# Patient Record
Sex: MALE | Race: WHITE | NOT HISPANIC OR LATINO | URBAN - METROPOLITAN AREA
[De-identification: names, ages, dates, MRNs, and addresses within clinical notes are randomized per-mention and may not be internally consistent; named-entity substitution may affect disease eponyms.]

---

## 2020-02-21 ENCOUNTER — EMERGENCY (EMERGENCY)
Facility: HOSPITAL | Age: 60
LOS: 0 days | Discharge: HOME | End: 2020-02-21
Attending: STUDENT IN AN ORGANIZED HEALTH CARE EDUCATION/TRAINING PROGRAM
Payer: MEDICARE

## 2020-02-21 VITALS
RESPIRATION RATE: 17 BRPM | OXYGEN SATURATION: 94 % | TEMPERATURE: 98 F | DIASTOLIC BLOOD PRESSURE: 84 MMHG | HEART RATE: 61 BPM | SYSTOLIC BLOOD PRESSURE: 157 MMHG

## 2020-02-21 VITALS — WEIGHT: 210.1 LBS | HEIGHT: 69 IN

## 2020-02-21 DIAGNOSIS — M25.562 PAIN IN LEFT KNEE: ICD-10-CM

## 2020-02-21 DIAGNOSIS — M25.462 EFFUSION, LEFT KNEE: ICD-10-CM

## 2020-02-21 DIAGNOSIS — H91.90 UNSPECIFIED HEARING LOSS, UNSPECIFIED EAR: ICD-10-CM

## 2020-02-21 PROCEDURE — 73564 X-RAY EXAM KNEE 4 OR MORE: CPT | Mod: LT

## 2020-02-21 PROCEDURE — 99283 EMERGENCY DEPT VISIT LOW MDM: CPT

## 2020-02-21 PROCEDURE — 99283 EMERGENCY DEPT VISIT LOW MDM: CPT | Mod: 25

## 2020-02-21 PROCEDURE — 73564 X-RAY EXAM KNEE 4 OR MORE: CPT | Mod: 26,LT

## 2020-02-21 NOTE — ED STATDOCS - MUSCULOSKELETAL, MLM
Swelling left anterior knee. Unable to straighten LLE at baseline. Sensation intact. Distal pulses intact.

## 2020-02-21 NOTE — ED STATDOCS - OBJECTIVE STATEMENT
58 y/o male with a PMHx of presents to the ED c/o left knee pain x5 days. Pt reports 5 days ago his left leg felt tight and has pain since. Pt reports he can not bear weight secondary to pain. Does not have an orthopedist. No other complaints at this time.

## 2020-02-21 NOTE — ED STATDOCS - PROGRESS NOTE DETAILS
60 y/o M presents with L knee pain x 5 days. Pt wears a knee brace at baseline secondary to prior spinal cord injury. Pt states over the past few days he has had difficulty bearing weight on his L knee. Denies fever/chills, trauma, n/v, or other complaints at this time  Ext: moderate swelling to L anterior knee. No erythema. baseline ROM. 2+ distal pulses. Large joint effusion on xray, Ace bandage applied, Recommend RICE therapy, ortho FU. -Vinicius Horner PA-C

## 2020-02-21 NOTE — ED STATDOCS - PATIENT PORTAL LINK FT
You can access the FollowMyHealth Patient Portal offered by [facility] by registering at the following website: [email or web address]. By joining [facility]’s FollowMyHealth portal, you will also be able to view your health information using other applications (apps) compatible with our system.

## 2020-02-21 NOTE — ED STATDOCS - ATTENDING CONTRIBUTION TO CARE
I, Taya Jewell DO,  performed the initial face to face bedside interview with this patient regarding history of present illness, review of symptoms and relevant past medical, social and family history.  I completed an independent physical examination.  I was the initial provider who evaluated this patient. I have signed out the follow up of any pending tests (i.e. labs, radiological studies) to the ACP.  I have communicated the patient’s plan of care and disposition with the ACP.  The history, relevant review of systems, past medical and surgical history, medical decision making, and physical examination was documented by the scribe in my presence and I attest to the accuracy of the documentation.

## 2020-02-21 NOTE — ED ADULT TRIAGE NOTE - CHIEF COMPLAINT QUOTE
Patient presents complaining of left knee pain. patient has brace on leg from chronic muscle weakness due to hx of skull fracture.

## 2020-02-21 NOTE — ED STATDOCS - CARE PROVIDER_API CALL
Billy Silva)  Tonny Reynaga  Physicians  [address] [place] [address]  Phone: [phone]  Fax: [phone]  Follow Up Time: Urgent

## 2023-09-11 ENCOUNTER — OFFICE (OUTPATIENT)
Dept: URBAN - METROPOLITAN AREA CLINIC 109 | Facility: CLINIC | Age: 63
Setting detail: OPHTHALMOLOGY
End: 2023-09-11
Payer: MEDICARE

## 2023-09-11 DIAGNOSIS — H25.11: ICD-10-CM

## 2023-09-11 PROCEDURE — 99204 OFFICE O/P NEW MOD 45 MIN: CPT | Performed by: OPHTHALMOLOGY

## 2023-09-11 ASSESSMENT — KERATOMETRY
OD_K1POWER_DIOPTERS: 44.75
OD_K2POWER_DIOPTERS: 45.50
OS_AXISANGLE_DEGREES: 121
OD_AXISANGLE_DEGREES: 055
OS_K2POWER_DIOPTERS: 45.25
OS_K1POWER_DIOPTERS: 45.00

## 2023-09-11 ASSESSMENT — REFRACTION_AUTOREFRACTION
OS_AXIS: 104
OD_CYLINDER: -1.25
OD_AXIS: 092
OS_SPHERE: +1.50
OS_CYLINDER: -1.00
OD_SPHERE: -0.50

## 2023-09-11 ASSESSMENT — VISUAL ACUITY
OS_BCVA: 20/60-2
OD_BCVA: 20/20

## 2023-09-11 ASSESSMENT — SPHEQUIV_DERIVED
OD_SPHEQUIV: -1.125
OS_SPHEQUIV: 1

## 2023-09-11 ASSESSMENT — CONFRONTATIONAL VISUAL FIELD TEST (CVF)
OS_FINDINGS: FULL
OD_FINDINGS: FULL

## 2023-09-11 ASSESSMENT — TONOMETRY
OS_IOP_MMHG: 12
OD_IOP_MMHG: 11

## 2023-09-11 ASSESSMENT — AXIALLENGTH_DERIVED
OS_AL: 22.645
OD_AL: 23.4344

## 2023-10-04 ENCOUNTER — OFFICE (OUTPATIENT)
Dept: URBAN - METROPOLITAN AREA CLINIC 109 | Facility: CLINIC | Age: 63
Setting detail: OPHTHALMOLOGY
End: 2023-10-04
Payer: MEDICARE

## 2023-10-04 DIAGNOSIS — H25.11: ICD-10-CM

## 2023-10-04 PROCEDURE — 92136 OPHTHALMIC BIOMETRY: CPT | Mod: RT | Performed by: OPHTHALMOLOGY

## 2023-10-04 PROCEDURE — 99213 OFFICE O/P EST LOW 20 MIN: CPT | Performed by: OPHTHALMOLOGY

## 2023-10-04 ASSESSMENT — KERATOMETRY
OS_AXISANGLE_DEGREES: 31
OS_K2POWER_DIOPTERS: 45.25
OD_K2POWER_DIOPTERS: 45.50
OD_K2POWER_DIOPTERS: 45.50
OS_AXISANGLE_DEGREES: 121
OS_K1POWER_DIOPTERS: 45.00
OD_CYLPOWER_DEGREES: 0.75
OD_AXISANGLE_DEGREES: 145
OD_CYLAXISANGLE_DEGREES: 055
OD_AXISANGLE_DEGREES: 055
OS_CYLPOWER_DEGREES: 0.25
OS_K1K2_AVERAGE: 45.125
OD_AXISANGLE2_DEGREES: 055
OD_K1POWER_DIOPTERS: 44.75
OS_AXISANGLE2_DEGREES: 121
OD_K1K2_AVERAGE: 45.125
OS_CYLAXISANGLE_DEGREES: 121
OS_K1POWER_DIOPTERS: 45.00
OS_K2POWER_DIOPTERS: 45.25
OD_K1POWER_DIOPTERS: 44.75

## 2023-10-04 ASSESSMENT — VISUAL ACUITY
OS_BCVA: 20/60-2
OD_BCVA: 20/20

## 2023-10-04 ASSESSMENT — CONFRONTATIONAL VISUAL FIELD TEST (CVF)
OD_FINDINGS: FULL
OS_FINDINGS: FULL

## 2023-10-04 ASSESSMENT — REFRACTION_AUTOREFRACTION
OD_SPHERE: -0.50
OD_CYLINDER: -1.25
OD_AXIS: 092
OS_CYLINDER: -1.00
OS_AXIS: 104
OS_SPHERE: +1.50

## 2023-10-04 ASSESSMENT — AXIALLENGTH_DERIVED
OS_AL: 22.645
OD_AL: 23.4344

## 2023-10-04 ASSESSMENT — SPHEQUIV_DERIVED
OD_SPHEQUIV: -1.125
OS_SPHEQUIV: 1

## 2023-10-10 ENCOUNTER — ASC (OUTPATIENT)
Dept: URBAN - METROPOLITAN AREA SURGERY 8 | Facility: SURGERY | Age: 63
Setting detail: OPHTHALMOLOGY
End: 2023-10-10
Payer: MEDICARE

## 2023-10-10 DIAGNOSIS — H25.11: ICD-10-CM

## 2023-10-10 PROCEDURE — 66984 XCAPSL CTRC RMVL W/O ECP: CPT | Mod: RT | Performed by: OPHTHALMOLOGY

## 2023-10-11 ENCOUNTER — OFFICE (OUTPATIENT)
Dept: URBAN - METROPOLITAN AREA CLINIC 109 | Facility: CLINIC | Age: 63
Setting detail: OPHTHALMOLOGY
End: 2023-10-11
Payer: MEDICARE

## 2023-10-11 ENCOUNTER — RX ONLY (RX ONLY)
Age: 63
End: 2023-10-11

## 2023-10-11 DIAGNOSIS — Z96.1: ICD-10-CM

## 2023-10-11 PROCEDURE — 99024 POSTOP FOLLOW-UP VISIT: CPT | Performed by: OPHTHALMOLOGY

## 2023-10-11 ASSESSMENT — KERATOMETRY
OS_K2POWER_DIOPTERS: 45.25
OD_K1POWER_DIOPTERS: 44.75
OD_AXISANGLE_DEGREES: 055
OD_K2POWER_DIOPTERS: 45.50
OS_AXISANGLE_DEGREES: 121
OS_K1POWER_DIOPTERS: 45.00

## 2023-10-11 ASSESSMENT — CONFRONTATIONAL VISUAL FIELD TEST (CVF)
OS_FINDINGS: FULL
OD_FINDINGS: FULL

## 2023-10-11 ASSESSMENT — REFRACTION_AUTOREFRACTION
OS_SPHERE: +1.50
OS_CYLINDER: -1.00
OD_AXIS: 092
OS_AXIS: 104
OD_SPHERE: -0.50
OD_CYLINDER: -1.25

## 2023-10-11 ASSESSMENT — VISUAL ACUITY
OS_BCVA: 20/25+1
OD_BCVA: 20/20

## 2023-10-11 ASSESSMENT — SPHEQUIV_DERIVED
OD_SPHEQUIV: -1.125
OS_SPHEQUIV: 1

## 2023-10-11 ASSESSMENT — AXIALLENGTH_DERIVED
OD_AL: 23.4344
OS_AL: 22.645

## 2023-10-11 ASSESSMENT — TONOMETRY: OD_IOP_MMHG: 18

## 2023-11-01 ENCOUNTER — OFFICE (OUTPATIENT)
Dept: URBAN - METROPOLITAN AREA CLINIC 109 | Facility: CLINIC | Age: 63
Setting detail: OPHTHALMOLOGY
End: 2023-11-01
Payer: MEDICARE

## 2023-11-01 DIAGNOSIS — Z96.1: ICD-10-CM

## 2023-11-01 PROCEDURE — 99024 POSTOP FOLLOW-UP VISIT: CPT | Performed by: OPHTHALMOLOGY

## 2023-11-01 ASSESSMENT — SPHEQUIV_DERIVED
OD_SPHEQUIV: 0.125
OS_SPHEQUIV: 0.625

## 2023-11-01 ASSESSMENT — REFRACTION_AUTOREFRACTION
OD_SPHERE: +0.50
OS_CYLINDER: -0.75
OS_SPHERE: +1.00
OD_CYLINDER: -0.75
OS_AXIS: 85
OD_AXIS: 121

## 2023-11-01 ASSESSMENT — CONFRONTATIONAL VISUAL FIELD TEST (CVF)
OD_FINDINGS: FULL
OS_FINDINGS: FULL

## 2024-09-11 ENCOUNTER — OFFICE (OUTPATIENT)
Dept: URBAN - METROPOLITAN AREA CLINIC 109 | Facility: CLINIC | Age: 64
Setting detail: OPHTHALMOLOGY
End: 2024-09-11
Payer: MEDICARE

## 2024-09-11 DIAGNOSIS — H01.005: ICD-10-CM

## 2024-09-11 DIAGNOSIS — H01.002: ICD-10-CM

## 2024-09-11 DIAGNOSIS — H01.001: ICD-10-CM

## 2024-09-11 DIAGNOSIS — H01.004: ICD-10-CM

## 2024-09-11 PROCEDURE — 99213 OFFICE O/P EST LOW 20 MIN: CPT | Performed by: OPHTHALMOLOGY

## 2024-09-11 ASSESSMENT — LID EXAM ASSESSMENTS
OS_BLEPHARITIS: LLL LUL T
OD_BLEPHARITIS: RLL RUL T

## 2024-09-11 ASSESSMENT — CONFRONTATIONAL VISUAL FIELD TEST (CVF)
OS_FINDINGS: FULL
OD_FINDINGS: FULL

## 2024-11-05 PROBLEM — H91.90 UNSPECIFIED HEARING LOSS, UNSPECIFIED EAR: Chronic | Status: ACTIVE | Noted: 2020-02-21

## 2024-11-07 PROBLEM — Z00.00 ENCOUNTER FOR PREVENTIVE HEALTH EXAMINATION: Status: ACTIVE | Noted: 2024-11-07

## 2024-11-11 ENCOUNTER — APPOINTMENT (OUTPATIENT)
Dept: OTOLARYNGOLOGY | Facility: CLINIC | Age: 64
End: 2024-11-11
Payer: MEDICARE

## 2024-11-11 VITALS
HEART RATE: 50 BPM | HEIGHT: 69 IN | WEIGHT: 205 LBS | BODY MASS INDEX: 30.36 KG/M2 | SYSTOLIC BLOOD PRESSURE: 153 MMHG | DIASTOLIC BLOOD PRESSURE: 84 MMHG

## 2024-11-11 DIAGNOSIS — H90.3 SENSORINEURAL HEARING LOSS, BILATERAL: ICD-10-CM

## 2024-11-11 PROCEDURE — 99204 OFFICE O/P NEW MOD 45 MIN: CPT

## 2024-11-23 ENCOUNTER — EMERGENCY (EMERGENCY)
Facility: HOSPITAL | Age: 64
LOS: 0 days | Discharge: HOME | End: 2024-11-23
Attending: EMERGENCY MEDICINE
Payer: MEDICARE

## 2024-11-23 ENCOUNTER — INPATIENT (INPATIENT)
Facility: HOSPITAL | Age: 64
LOS: 8 days | Discharge: NURSING, REHAB OR LONG-TERM CARE | DRG: 563 | End: 2024-12-02
Attending: INTERNAL MEDICINE | Admitting: INTERNAL MEDICINE
Payer: MEDICARE

## 2024-11-23 VITALS
HEART RATE: 54 BPM | HEIGHT: 70 IN | TEMPERATURE: 98 F | RESPIRATION RATE: 16 BRPM | DIASTOLIC BLOOD PRESSURE: 90 MMHG | WEIGHT: 205.03 LBS | SYSTOLIC BLOOD PRESSURE: 149 MMHG | OXYGEN SATURATION: 99 %

## 2024-11-23 VITALS
TEMPERATURE: 98 F | RESPIRATION RATE: 16 BRPM | HEIGHT: 70 IN | OXYGEN SATURATION: 98 % | SYSTOLIC BLOOD PRESSURE: 182 MMHG | WEIGHT: 205.03 LBS | DIASTOLIC BLOOD PRESSURE: 79 MMHG | HEART RATE: 53 BPM

## 2024-11-23 VITALS
DIASTOLIC BLOOD PRESSURE: 79 MMHG | TEMPERATURE: 98 F | HEART RATE: 62 BPM | OXYGEN SATURATION: 96 % | RESPIRATION RATE: 16 BRPM | SYSTOLIC BLOOD PRESSURE: 139 MMHG

## 2024-11-23 DIAGNOSIS — S43.005A UNSPECIFIED DISLOCATION OF LEFT SHOULDER JOINT, INITIAL ENCOUNTER: ICD-10-CM

## 2024-11-23 DIAGNOSIS — S43.015A ANTERIOR DISLOCATION OF LEFT HUMERUS, INITIAL ENCOUNTER: ICD-10-CM
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
## 2024-11-23 LAB
ALBUMIN SERPL ELPH-MCNC: 3.9 G/DL — SIGNIFICANT CHANGE UP (ref 3.3–5)
ALP SERPL-CCNC: 65 U/L — SIGNIFICANT CHANGE UP (ref 40–120)
ALT FLD-CCNC: 36 U/L — SIGNIFICANT CHANGE UP (ref 12–78)
ANION GAP SERPL CALC-SCNC: 3 MMOL/L — LOW (ref 5–17)
APPEARANCE UR: CLEAR — SIGNIFICANT CHANGE UP
AST SERPL-CCNC: 29 U/L — SIGNIFICANT CHANGE UP (ref 15–37)
BACTERIA # UR AUTO: ABNORMAL /HPF
BASOPHILS # BLD AUTO: 0.04 K/UL — SIGNIFICANT CHANGE UP (ref 0–0.2)
BASOPHILS # BLD AUTO: 0.04 K/UL — SIGNIFICANT CHANGE UP (ref 0–0.2)
BASOPHILS NFR BLD AUTO: 0.3 % — SIGNIFICANT CHANGE UP (ref 0–2)
BASOPHILS NFR BLD AUTO: 0.3 % — SIGNIFICANT CHANGE UP (ref 0–2)
BILIRUB SERPL-MCNC: 0.7 MG/DL — SIGNIFICANT CHANGE UP (ref 0.2–1.2)
BILIRUB UR-MCNC: NEGATIVE — SIGNIFICANT CHANGE UP
BUN SERPL-MCNC: 23 MG/DL — SIGNIFICANT CHANGE UP (ref 7–23)
CALCIUM SERPL-MCNC: 9.4 MG/DL — SIGNIFICANT CHANGE UP (ref 8.5–10.1)
CAST: 0 /LPF — SIGNIFICANT CHANGE UP (ref 0–4)
CHLORIDE SERPL-SCNC: 106 MMOL/L — SIGNIFICANT CHANGE UP (ref 96–108)
CO2 SERPL-SCNC: 30 MMOL/L — SIGNIFICANT CHANGE UP (ref 22–31)
COLOR SPEC: YELLOW — SIGNIFICANT CHANGE UP
CREAT SERPL-MCNC: 0.84 MG/DL — SIGNIFICANT CHANGE UP (ref 0.5–1.3)
DIFF PNL FLD: ABNORMAL
EGFR: 97 ML/MIN/1.73M2 — SIGNIFICANT CHANGE UP
EOSINOPHIL # BLD AUTO: 0.03 K/UL — SIGNIFICANT CHANGE UP (ref 0–0.5)
EOSINOPHIL # BLD AUTO: 0.1 K/UL — SIGNIFICANT CHANGE UP (ref 0–0.5)
EOSINOPHIL NFR BLD AUTO: 0.2 % — SIGNIFICANT CHANGE UP (ref 0–6)
EOSINOPHIL NFR BLD AUTO: 0.7 % — SIGNIFICANT CHANGE UP (ref 0–6)
GLUCOSE SERPL-MCNC: 122 MG/DL — HIGH (ref 70–99)
GLUCOSE UR QL: NEGATIVE MG/DL — SIGNIFICANT CHANGE UP
HCT VFR BLD CALC: 46.2 % — SIGNIFICANT CHANGE UP (ref 39–50)
HCT VFR BLD CALC: 49.1 % — SIGNIFICANT CHANGE UP (ref 39–50)
HGB BLD-MCNC: 15.7 G/DL — SIGNIFICANT CHANGE UP (ref 13–17)
HGB BLD-MCNC: 16.6 G/DL — SIGNIFICANT CHANGE UP (ref 13–17)
IMM GRANULOCYTES NFR BLD AUTO: 0.4 % — SIGNIFICANT CHANGE UP (ref 0–0.9)
IMM GRANULOCYTES NFR BLD AUTO: 0.5 % — SIGNIFICANT CHANGE UP (ref 0–0.9)
KETONES UR-MCNC: 15 MG/DL
LACTATE SERPL-SCNC: 1.2 MMOL/L — SIGNIFICANT CHANGE UP (ref 0.7–2)
LEUKOCYTE ESTERASE UR-ACNC: ABNORMAL
LYMPHOCYTES # BLD AUTO: 1.39 K/UL — SIGNIFICANT CHANGE UP (ref 1–3.3)
LYMPHOCYTES # BLD AUTO: 1.4 K/UL — SIGNIFICANT CHANGE UP (ref 1–3.3)
LYMPHOCYTES # BLD AUTO: 10 % — LOW (ref 13–44)
LYMPHOCYTES # BLD AUTO: 10.3 % — LOW (ref 13–44)
MCHC RBC-ENTMCNC: 33.7 PG — SIGNIFICANT CHANGE UP (ref 27–34)
MCHC RBC-ENTMCNC: 33.8 G/DL — SIGNIFICANT CHANGE UP (ref 32–36)
MCHC RBC-ENTMCNC: 34 G/DL — SIGNIFICANT CHANGE UP (ref 32–36)
MCHC RBC-ENTMCNC: 34.1 PG — HIGH (ref 27–34)
MCV RBC AUTO: 100.2 FL — HIGH (ref 80–100)
MCV RBC AUTO: 99.6 FL — SIGNIFICANT CHANGE UP (ref 80–100)
MONOCYTES # BLD AUTO: 1.1 K/UL — HIGH (ref 0–0.9)
MONOCYTES # BLD AUTO: 1.19 K/UL — HIGH (ref 0–0.9)
MONOCYTES NFR BLD AUTO: 7.9 % — SIGNIFICANT CHANGE UP (ref 2–14)
MONOCYTES NFR BLD AUTO: 8.8 % — SIGNIFICANT CHANGE UP (ref 2–14)
NEUTROPHILS # BLD AUTO: 10.83 K/UL — HIGH (ref 1.8–7.4)
NEUTROPHILS # BLD AUTO: 11.27 K/UL — HIGH (ref 1.8–7.4)
NEUTROPHILS NFR BLD AUTO: 79.9 % — HIGH (ref 43–77)
NEUTROPHILS NFR BLD AUTO: 80.7 % — HIGH (ref 43–77)
NITRITE UR-MCNC: POSITIVE
PH UR: 6.5 — SIGNIFICANT CHANGE UP (ref 5–8)
PLATELET # BLD AUTO: 235 K/UL — SIGNIFICANT CHANGE UP (ref 150–400)
PLATELET # BLD AUTO: 249 K/UL — SIGNIFICANT CHANGE UP (ref 150–400)
POTASSIUM SERPL-MCNC: 4.1 MMOL/L — SIGNIFICANT CHANGE UP (ref 3.5–5.3)
POTASSIUM SERPL-SCNC: 4.1 MMOL/L — SIGNIFICANT CHANGE UP (ref 3.5–5.3)
PROT SERPL-MCNC: 8 GM/DL — SIGNIFICANT CHANGE UP (ref 6–8.3)
PROT UR-MCNC: SIGNIFICANT CHANGE UP MG/DL
RBC # BLD: 4.61 M/UL — SIGNIFICANT CHANGE UP (ref 4.2–5.8)
RBC # BLD: 4.93 M/UL — SIGNIFICANT CHANGE UP (ref 4.2–5.8)
RBC # FLD: 11.9 % — SIGNIFICANT CHANGE UP (ref 10.3–14.5)
RBC # FLD: 12 % — SIGNIFICANT CHANGE UP (ref 10.3–14.5)
RBC CASTS # UR COMP ASSIST: 1 /HPF — SIGNIFICANT CHANGE UP (ref 0–4)
SODIUM SERPL-SCNC: 139 MMOL/L — SIGNIFICANT CHANGE UP (ref 135–145)
SP GR SPEC: 1.02 — SIGNIFICANT CHANGE UP (ref 1–1.03)
SQUAMOUS # UR AUTO: 0 /HPF — SIGNIFICANT CHANGE UP (ref 0–5)
UROBILINOGEN FLD QL: 0.2 MG/DL — SIGNIFICANT CHANGE UP (ref 0.2–1)
WBC # BLD: 13.56 K/UL — HIGH (ref 3.8–10.5)
WBC # BLD: 13.96 K/UL — HIGH (ref 3.8–10.5)
WBC # FLD AUTO: 13.56 K/UL — HIGH (ref 3.8–10.5)
WBC # FLD AUTO: 13.96 K/UL — HIGH (ref 3.8–10.5)
WBC UR QL: 152 /HPF — HIGH (ref 0–5)

## 2024-11-23 PROCEDURE — 23650 CLTX SHO DSLC W/MNPJ WO ANES: CPT | Mod: 54,LT,77

## 2024-11-23 PROCEDURE — 97162 PT EVAL MOD COMPLEX 30 MIN: CPT | Mod: GP

## 2024-11-23 PROCEDURE — 73020 X-RAY EXAM OF SHOULDER: CPT | Mod: LT

## 2024-11-23 PROCEDURE — 73080 X-RAY EXAM OF ELBOW: CPT | Mod: RT

## 2024-11-23 PROCEDURE — 85025 COMPLETE CBC W/AUTO DIFF WBC: CPT

## 2024-11-23 PROCEDURE — 70551 MRI BRAIN STEM W/O DYE: CPT | Mod: MC

## 2024-11-23 PROCEDURE — 73030 X-RAY EXAM OF SHOULDER: CPT | Mod: LT

## 2024-11-23 PROCEDURE — 93010 ELECTROCARDIOGRAM REPORT: CPT

## 2024-11-23 PROCEDURE — 80048 BASIC METABOLIC PNL TOTAL CA: CPT

## 2024-11-23 PROCEDURE — 71045 X-RAY EXAM CHEST 1 VIEW: CPT | Mod: 26

## 2024-11-23 PROCEDURE — 72040 X-RAY EXAM NECK SPINE 2-3 VW: CPT

## 2024-11-23 PROCEDURE — 73020 X-RAY EXAM OF SHOULDER: CPT | Mod: 26,59,76,LT

## 2024-11-23 PROCEDURE — 90662 IIV NO PRSV INCREASED AG IM: CPT

## 2024-11-23 PROCEDURE — 80053 COMPREHEN METABOLIC PANEL: CPT

## 2024-11-23 PROCEDURE — 85610 PROTHROMBIN TIME: CPT

## 2024-11-23 PROCEDURE — 72128 CT CHEST SPINE W/O DYE: CPT | Mod: MC

## 2024-11-23 PROCEDURE — 36415 COLL VENOUS BLD VENIPUNCTURE: CPT

## 2024-11-23 PROCEDURE — 72131 CT LUMBAR SPINE W/O DYE: CPT | Mod: MC

## 2024-11-23 PROCEDURE — 99285 EMERGENCY DEPT VISIT HI MDM: CPT | Mod: 57

## 2024-11-23 PROCEDURE — 96374 THER/PROPH/DIAG INJ IV PUSH: CPT | Mod: XU

## 2024-11-23 PROCEDURE — 99223 1ST HOSP IP/OBS HIGH 75: CPT

## 2024-11-23 PROCEDURE — 73020 X-RAY EXAM OF SHOULDER: CPT | Mod: 26,59,LT

## 2024-11-23 PROCEDURE — 97166 OT EVAL MOD COMPLEX 45 MIN: CPT | Mod: GO

## 2024-11-23 PROCEDURE — 72170 X-RAY EXAM OF PELVIS: CPT

## 2024-11-23 PROCEDURE — 99285 EMERGENCY DEPT VISIT HI MDM: CPT | Mod: 25

## 2024-11-23 PROCEDURE — 97116 GAIT TRAINING THERAPY: CPT | Mod: GP

## 2024-11-23 PROCEDURE — 85027 COMPLETE CBC AUTOMATED: CPT

## 2024-11-23 PROCEDURE — 73060 X-RAY EXAM OF HUMERUS: CPT | Mod: 26,LT

## 2024-11-23 PROCEDURE — 72170 X-RAY EXAM OF PELVIS: CPT | Mod: 26

## 2024-11-23 PROCEDURE — 23545 CLTX ACROMCLAV DISLC W/MNPJ: CPT | Mod: LT

## 2024-11-23 PROCEDURE — 93306 TTE W/DOPPLER COMPLETE: CPT

## 2024-11-23 PROCEDURE — 97530 THERAPEUTIC ACTIVITIES: CPT | Mod: GO

## 2024-11-23 PROCEDURE — 97535 SELF CARE MNGMENT TRAINING: CPT | Mod: GO

## 2024-11-23 PROCEDURE — 90471 IMMUNIZATION ADMIN: CPT

## 2024-11-23 PROCEDURE — 72125 CT NECK SPINE W/O DYE: CPT | Mod: MC

## 2024-11-23 PROCEDURE — 84443 ASSAY THYROID STIM HORMONE: CPT

## 2024-11-23 PROCEDURE — 83036 HEMOGLOBIN GLYCOSYLATED A1C: CPT

## 2024-11-23 PROCEDURE — 73030 X-RAY EXAM OF SHOULDER: CPT | Mod: 26,LT,76

## 2024-11-23 PROCEDURE — 96375 TX/PRO/DX INJ NEW DRUG ADDON: CPT | Mod: XU

## 2024-11-23 PROCEDURE — 73060 X-RAY EXAM OF HUMERUS: CPT | Mod: LT

## 2024-11-23 PROCEDURE — 85730 THROMBOPLASTIN TIME PARTIAL: CPT

## 2024-11-23 PROCEDURE — 70450 CT HEAD/BRAIN W/O DYE: CPT | Mod: MC

## 2024-11-23 PROCEDURE — 73030 X-RAY EXAM OF SHOULDER: CPT | Mod: 26,LT

## 2024-11-23 PROCEDURE — 80061 LIPID PANEL: CPT

## 2024-11-23 PROCEDURE — 93005 ELECTROCARDIOGRAM TRACING: CPT

## 2024-11-23 RX ORDER — CEFTRIAXONE SODIUM 1 G
1000 VIAL (EA) INJECTION ONCE
Refills: 0 | Status: COMPLETED | OUTPATIENT
Start: 2024-11-23 | End: 2024-11-23

## 2024-11-23 RX ORDER — ACETAMINOPHEN 500MG 500 MG/1
1000 TABLET, COATED ORAL ONCE
Refills: 0 | Status: COMPLETED | OUTPATIENT
Start: 2024-11-23 | End: 2024-11-23

## 2024-11-23 RX ORDER — SODIUM CHLORIDE 9 MG/ML
2800 INJECTION, SOLUTION INTRAMUSCULAR; INTRAVENOUS; SUBCUTANEOUS ONCE
Refills: 0 | Status: COMPLETED | OUTPATIENT
Start: 2024-11-23 | End: 2024-11-23

## 2024-11-23 RX ORDER — INFLUENZA VIRUS VACCINE 15; 15; 15; 15 UG/.5ML; UG/.5ML; UG/.5ML; UG/.5ML
0.5 SUSPENSION INTRAMUSCULAR ONCE
Refills: 0 | Status: COMPLETED | OUTPATIENT
Start: 2024-11-23 | End: 2024-12-02

## 2024-11-23 RX ORDER — OXYCODONE HYDROCHLORIDE AND ACETAMINOPHEN 10; 325 MG/1; MG/1
1 TABLET ORAL
Qty: 10 | Refills: 0
Start: 2024-11-23 | End: 2024-11-25

## 2024-11-23 RX ORDER — ONDANSETRON HCL/PF 4 MG/2 ML
4 VIAL (ML) INJECTION ONCE
Refills: 0 | Status: COMPLETED | OUTPATIENT
Start: 2024-11-23 | End: 2024-11-23

## 2024-11-23 RX ORDER — ACETAMINOPHEN 500MG 500 MG/1
650 TABLET, COATED ORAL EVERY 6 HOURS
Refills: 0 | Status: DISCONTINUED | OUTPATIENT
Start: 2024-11-23 | End: 2024-12-02

## 2024-11-23 RX ADMIN — ACETAMINOPHEN 500MG 400 MILLIGRAM(S): 500 TABLET, COATED ORAL at 19:05

## 2024-11-23 RX ADMIN — Medication 3 MILLILITER(S): at 15:06

## 2024-11-23 RX ADMIN — ACETAMINOPHEN 500MG 1000 MILLIGRAM(S): 500 TABLET, COATED ORAL at 21:51

## 2024-11-23 RX ADMIN — Medication 4 MILLIGRAM(S): at 15:04

## 2024-11-23 RX ADMIN — SODIUM CHLORIDE 2800 MILLILITER(S): 9 INJECTION, SOLUTION INTRAMUSCULAR; INTRAVENOUS; SUBCUTANEOUS at 20:01

## 2024-11-23 RX ADMIN — Medication 1000 MILLIGRAM(S): at 19:55

## 2024-11-23 RX ADMIN — Medication 2 MILLIGRAM(S): at 15:04

## 2024-11-23 NOTE — PATIENT PROFILE ADULT - FALL HARM RISK - HARM RISK INTERVENTIONS

## 2024-11-23 NOTE — ED PROVIDER NOTE - OBJECTIVE STATEMENT
64-year-old male past medical history of left lower extremity weakness/paralysis due to a spinal cord injury 1987, hard of hearing, who presents with chief complaint of fall.  Patient was evaluated earlier in the ED, patient had tripped and fallen while trying to walk into the garage.  Patient complained of shoulder pain.  Patient had a positive anterior shoulder dislocation that was reduced by previous ED physician.  Patient was discharged home with sling.  The patient states that they went to go  prescription, and he fell again.  Complaining of some left shoulder pain and left arm numbness.  He denies head injury.  He denies any other injuries.  No other concerns.

## 2024-11-23 NOTE — H&P ADULT - HISTORY OF PRESENT ILLNESS
65 y/o M w/ PMH of LLE paralysis 2/2 spinal cord injury in 1987 w/ LLE brace, p/w mechanical fall. Patient had a trip and fall earlier and came to ED, found to have an anterior shoulder dislocation. Patient was discharged home w/ sling. Patient then had another mechanical fall where he tripped when he went to get prescription and came to ED again. Patient has a LLE brace since 1987 since his previous spinal cord injury, and now with a sling. Patient is having difficutly with ambulation. He lives alone at home       PSH: Spinal surgery     Social Hx: Denies tobacco / drugs, etoh - 2 drinks 2 times per week     Family Hx: Denies pertinent hx

## 2024-11-23 NOTE — ED ADULT NURSE NOTE - NSFALLRISKINTERV_ED_ALL_ED

## 2024-11-23 NOTE — H&P ADULT - ASSESSMENT
63 y/o M w/ PMH of LLE paralysis 2/2 spinal cord injury in  w/ LLE brace, p/w mechanical fall    *Anterior shoulder dislocation s/p mechanical fall  -Fall precautions  -PT consult  -Pain control  -F/u ortho     *Bradycardia  -Remote tele  -Cardio consult   -TSH   -EK bpm, incomplete RBBB   -Echo     *UTI  -Ceftriaxone  -F/u urine cx     *Advance Directives  -Denies having advance care directives in place     *DVT ppx  -Heparin SubQ     >75 mins required for admission    65 y/o M w/ PMH of LLE paralysis 2/2 spinal cord injury in  w/ LLE brace, p/w mechanical fall    *Anterior shoulder dislocation s/p mechanical fall  -Fall precautions  -PT consult  -Pain control  -F/u ortho   -Social Work consult     *Bradycardia  -Remote tele  -Cardio consult   -TSH   -EK bpm, incomplete RBBB   -Echo     *UTI  -Ceftriaxone  -F/u urine cx     *Advance Directives  -Denies having advance care directives in place     *DVT ppx  -Heparin SubQ     >75 mins required for admission

## 2024-11-23 NOTE — ED ADULT NURSE REASSESSMENT NOTE - NS ED NURSE REASSESS COMMENT FT1
Received patient AxOX4, resting comfortably in bed. LUE in sling, able to move fingers, positive pulse, denies numbness or tingling. Positive cap refill. Denies pain at present. LLE brace in place, per patient, he has been wearing this since 1987 after sustaining a traumatic brain and spinal injury. VSS. Updated on plan of care, all questions and concerns addressed. [place] condom catheter in place, draining yellow urine. Stretcher locked and in lowest position, call bell within reach, will continue to monitor.

## 2024-11-23 NOTE — ED PROVIDER NOTE - PHYSICAL EXAMINATION
Patient is awake, alert, orient x 3  Head is normocephalic, atraumatic  Heart sounds within normal limits  Breath sounds within normal limits  Abdomen soft, nontender  Left upper extremity in a sling, mild tenderness palpation of the left shoulder, neurovascularly intact

## 2024-11-23 NOTE — ED ADULT TRIAGE NOTE - CHIEF COMPLAINT QUOTE
patient brought in by EMS c/o fall.  was dc from [facility] recently after having a fall this morning.  was at [facility] picking up prescription and had to  urinate,  attempted to get out of car and fell.  family unable to get him up so called 911.  denies pain from fall.

## 2024-11-23 NOTE — PHARMACOTHERAPY INTERVENTION NOTE - COMMENTS
Medication history complete. Medications and allergies reviewed with patient and confirmed with . Patient states he is not currently taking prescription medication.

## 2024-11-23 NOTE — ED ADULT NURSE NOTE - OBJECTIVE STATEMENT
64y male presents to the A/OX4, ED s/p fall. Pt was recently seen at  this afternoon for  fall and left shoulder dislocation, pt was d/c home but while at [facility] pt fell and was unable to get up. Pt has hx left sided paralysis due to spinal cord injury. Pt denies headstrike or injury, endorses left arm pain, numbness and tingling. MD Moya made aware.

## 2024-11-23 NOTE — ED ADULT TRIAGE NOTE - INTERNATIONAL TRAVEL
Pt c/o non-radiating midsternal chest pain described as "burning" x2 hours. States improvement in condition since arrival to ED. Hx hypothyroidism. Pt denies sob, n/v, dizziness, lightheadedness, fevers/chills. Pt well appearing. No

## 2024-11-23 NOTE — ED PROVIDER NOTE - CLINICAL SUMMARY MEDICAL DECISION MAKING FREE TEXT BOX
Patient with a shoulder dislocation again, this was reduced in the department.  Patient's paresthesias improved with reduction of the shoulder. Patient did not have a head injury or LOC, and neurologic exam is baseline.  Does not require head imaging. Patient also with a UTI, lactate was normal, mild elevation white blood cell count, patient was given IV fluids, Rocephin IV, as well as Tylenol for pain.  Patient mated to medicine service for further evaluation management given multiple falls, possible need for short-term rehab.

## 2024-11-23 NOTE — ED ADULT NURSE REASSESSMENT NOTE - NS ED NURSE REASSESS COMMENT FT1
pt assisted to wheelchair for d/c with great difficulty. pt unsteady and needs use of left arm to assist with movement. pt made aware that d/c home is unsafe and he is at increased risk for fall. pt refusing to stay and wishes to go home. pt states that he has multiple assistive devises at home to help him get around. Dr. Hill aware and agreeable that pt is fall risk at baseline and okay to d/c home with friend at bedside.

## 2024-11-23 NOTE — ED ADULT NURSE NOTE - CHIEF COMPLAINT QUOTE
patient brought in by EMS c/o fall.  was dc from [facility] recently after having a fall this morning.  was at [facility] picking up prescription and had to  urinate,  attempted to get out of car and fell.  family unable to get him up so called 911.  denies pain from fall.

## 2024-11-23 NOTE — ED PROVIDER NOTE - CARE PLAN
Principal Discharge DX:	Dislocation, shoulder closed, left, initial encounter  Secondary Diagnosis:	Acute UTI  Secondary Diagnosis:	Multiple falls   1

## 2024-11-24 DIAGNOSIS — I49.5 SICK SINUS SYNDROME: ICD-10-CM

## 2024-11-24 LAB
A1C WITH ESTIMATED AVERAGE GLUCOSE RESULT: 5.6 % — SIGNIFICANT CHANGE UP (ref 4–5.6)
ALBUMIN SERPL ELPH-MCNC: 3.1 G/DL — LOW (ref 3.3–5)
ALP SERPL-CCNC: 49 U/L — SIGNIFICANT CHANGE UP (ref 40–120)
ALT FLD-CCNC: 28 U/L — SIGNIFICANT CHANGE UP (ref 12–78)
ANION GAP SERPL CALC-SCNC: 4 MMOL/L — LOW (ref 5–17)
APTT BLD: 33.5 SEC — SIGNIFICANT CHANGE UP (ref 24.5–35.6)
AST SERPL-CCNC: 28 U/L — SIGNIFICANT CHANGE UP (ref 15–37)
BASOPHILS # BLD AUTO: 0.02 K/UL — SIGNIFICANT CHANGE UP (ref 0–0.2)
BASOPHILS NFR BLD AUTO: 0.2 % — SIGNIFICANT CHANGE UP (ref 0–2)
BILIRUB SERPL-MCNC: 0.6 MG/DL — SIGNIFICANT CHANGE UP (ref 0.2–1.2)
BUN SERPL-MCNC: 19 MG/DL — SIGNIFICANT CHANGE UP (ref 7–23)
CALCIUM SERPL-MCNC: 8.4 MG/DL — LOW (ref 8.5–10.1)
CHLORIDE SERPL-SCNC: 112 MMOL/L — HIGH (ref 96–108)
CHOLEST SERPL-MCNC: 205 MG/DL — HIGH
CO2 SERPL-SCNC: 25 MMOL/L — SIGNIFICANT CHANGE UP (ref 22–31)
CREAT SERPL-MCNC: 0.7 MG/DL — SIGNIFICANT CHANGE UP (ref 0.5–1.3)
EGFR: 102 ML/MIN/1.73M2 — SIGNIFICANT CHANGE UP
EOSINOPHIL # BLD AUTO: 0.03 K/UL — SIGNIFICANT CHANGE UP (ref 0–0.5)
EOSINOPHIL NFR BLD AUTO: 0.3 % — SIGNIFICANT CHANGE UP (ref 0–6)
ESTIMATED AVERAGE GLUCOSE: 114 MG/DL — SIGNIFICANT CHANGE UP (ref 68–114)
GLUCOSE SERPL-MCNC: 111 MG/DL — HIGH (ref 70–99)
HCT VFR BLD CALC: 42.5 % — SIGNIFICANT CHANGE UP (ref 39–50)
HDLC SERPL-MCNC: 32 MG/DL — LOW
HGB BLD-MCNC: 14.1 G/DL — SIGNIFICANT CHANGE UP (ref 13–17)
IMM GRANULOCYTES NFR BLD AUTO: 0.3 % — SIGNIFICANT CHANGE UP (ref 0–0.9)
INR BLD: 1 RATIO — SIGNIFICANT CHANGE UP (ref 0.85–1.16)
LIPID PNL WITH DIRECT LDL SERPL: 138 MG/DL — HIGH
LYMPHOCYTES # BLD AUTO: 1.35 K/UL — SIGNIFICANT CHANGE UP (ref 1–3.3)
LYMPHOCYTES # BLD AUTO: 14.9 % — SIGNIFICANT CHANGE UP (ref 13–44)
MCHC RBC-ENTMCNC: 33.2 G/DL — SIGNIFICANT CHANGE UP (ref 32–36)
MCHC RBC-ENTMCNC: 33.4 PG — SIGNIFICANT CHANGE UP (ref 27–34)
MCV RBC AUTO: 100.7 FL — HIGH (ref 80–100)
MONOCYTES # BLD AUTO: 1.2 K/UL — HIGH (ref 0–0.9)
MONOCYTES NFR BLD AUTO: 13.2 % — SIGNIFICANT CHANGE UP (ref 2–14)
NEUTROPHILS # BLD AUTO: 6.44 K/UL — SIGNIFICANT CHANGE UP (ref 1.8–7.4)
NEUTROPHILS NFR BLD AUTO: 71.1 % — SIGNIFICANT CHANGE UP (ref 43–77)
NON HDL CHOLESTEROL: 173 MG/DL — HIGH
PLATELET # BLD AUTO: 209 K/UL — SIGNIFICANT CHANGE UP (ref 150–400)
POTASSIUM SERPL-MCNC: 3.7 MMOL/L — SIGNIFICANT CHANGE UP (ref 3.5–5.3)
POTASSIUM SERPL-SCNC: 3.7 MMOL/L — SIGNIFICANT CHANGE UP (ref 3.5–5.3)
PROT SERPL-MCNC: 6.5 GM/DL — SIGNIFICANT CHANGE UP (ref 6–8.3)
PROTHROM AB SERPL-ACNC: 11.8 SEC — SIGNIFICANT CHANGE UP (ref 9.9–13.4)
RBC # BLD: 4.22 M/UL — SIGNIFICANT CHANGE UP (ref 4.2–5.8)
RBC # FLD: 12.3 % — SIGNIFICANT CHANGE UP (ref 10.3–14.5)
SODIUM SERPL-SCNC: 141 MMOL/L — SIGNIFICANT CHANGE UP (ref 135–145)
TRIGL SERPL-MCNC: 197 MG/DL — HIGH
TSH SERPL-MCNC: 1.12 UU/ML — SIGNIFICANT CHANGE UP (ref 0.34–4.82)
WBC # BLD: 9.07 K/UL — SIGNIFICANT CHANGE UP (ref 3.8–10.5)
WBC # FLD AUTO: 9.07 K/UL — SIGNIFICANT CHANGE UP (ref 3.8–10.5)

## 2024-11-24 PROCEDURE — 73030 X-RAY EXAM OF SHOULDER: CPT | Mod: 26,LT

## 2024-11-24 PROCEDURE — 99222 1ST HOSP IP/OBS MODERATE 55: CPT

## 2024-11-24 PROCEDURE — 73020 X-RAY EXAM OF SHOULDER: CPT | Mod: 26,XE,LT

## 2024-11-24 PROCEDURE — 99233 SBSQ HOSP IP/OBS HIGH 50: CPT

## 2024-11-24 PROCEDURE — 93010 ELECTROCARDIOGRAM REPORT: CPT

## 2024-11-24 RX ORDER — HEPARIN SODIUM,PORCINE 1000/ML
5000 VIAL (ML) INJECTION EVERY 12 HOURS
Refills: 0 | Status: DISCONTINUED | OUTPATIENT
Start: 2024-11-24 | End: 2024-11-26

## 2024-11-24 RX ORDER — OXYCODONE HYDROCHLORIDE 30 MG/1
5 TABLET ORAL EVERY 6 HOURS
Refills: 0 | Status: DISCONTINUED | OUTPATIENT
Start: 2024-11-24 | End: 2024-12-01

## 2024-11-24 RX ORDER — CEFTRIAXONE SODIUM 1 G
1000 VIAL (EA) INJECTION EVERY 24 HOURS
Refills: 0 | Status: COMPLETED | OUTPATIENT
Start: 2024-11-24 | End: 2024-11-26

## 2024-11-24 RX ORDER — HEPARIN SODIUM,PORCINE 1000/ML
5000 VIAL (ML) INJECTION EVERY 12 HOURS
Refills: 0 | Status: DISCONTINUED | OUTPATIENT
Start: 2024-11-24 | End: 2024-11-24

## 2024-11-24 RX ADMIN — Medication 2 MILLIGRAM(S): at 21:25

## 2024-11-24 RX ADMIN — Medication 5000 UNIT(S): at 20:55

## 2024-11-24 RX ADMIN — Medication 2 MILLIGRAM(S): at 01:48

## 2024-11-24 RX ADMIN — Medication 5000 UNIT(S): at 08:58

## 2024-11-24 RX ADMIN — Medication 1000 MILLIGRAM(S): at 20:15

## 2024-11-24 RX ADMIN — ACETAMINOPHEN 500MG 650 MILLIGRAM(S): 500 TABLET, COATED ORAL at 20:16

## 2024-11-24 RX ADMIN — OXYCODONE HYDROCHLORIDE 5 MILLIGRAM(S): 30 TABLET ORAL at 17:48

## 2024-11-24 RX ADMIN — Medication 2 MILLIGRAM(S): at 20:55

## 2024-11-24 RX ADMIN — Medication 2 MILLIGRAM(S): at 02:18

## 2024-11-24 RX ADMIN — OXYCODONE HYDROCHLORIDE 5 MILLIGRAM(S): 30 TABLET ORAL at 23:32

## 2024-11-24 RX ADMIN — OXYCODONE HYDROCHLORIDE 5 MILLIGRAM(S): 30 TABLET ORAL at 17:18

## 2024-11-24 RX ADMIN — ACETAMINOPHEN 500MG 650 MILLIGRAM(S): 500 TABLET, COATED ORAL at 20:46

## 2024-11-24 NOTE — PROVIDER CONTACT NOTE (FALL NOTIFICATION) - ASSESSMENT
Non slip socks in place correctly. Pt was assisted to the floor in a controlled manner by PT. Pt landed on buttocks gently. No head strike. Pt denies pain. Nonskid socks in place correctly. Pt was assisted to the floor by physical therapy on buttocks gently. No head strike. Pt denies pain. No injuries occurred.

## 2024-11-24 NOTE — PHYSICAL THERAPY INITIAL EVALUATION ADULT - RANGE OF MOTION EXAMINATION, REHAB EVAL
Except left UE not assessed secondary to left shoulder dislocation, Left LE in Brace/no ROM deficits were identified

## 2024-11-24 NOTE — PROVIDER CONTACT NOTE (FALL NOTIFICATION) - ACTION/TREATMENT ORDERED:
Code fall overhead. Dr. Ferreira and Orthopedic Team notified and aware. Shoulder x-ray x2 ordered. Code fall overhead. Dr. Ferreira and Orthopedic Team notified and aware. Shoulder x-ray x2 ordered and resulted negative.

## 2024-11-24 NOTE — CONSULT NOTE ADULT - PROBLEM SELECTOR RECOMMENDATION 9
Mild asymptomatic bradycardia at rest. No indication for PPM. R/o hypothyroidism.   Follow up in 2 weeks with me. Mild asymptomatic bradycardia at rest. No indication for PPM. normal TSH  Follow up in 4 weeks with me.

## 2024-11-24 NOTE — PROVIDER CONTACT NOTE (FALL NOTIFICATION) - SITUATION
Pt transferring to chair using right nancy walker with PT. Pt was about to sit down in the chair when pt's foot began to slide and PT assisted pt to the floor. Pt transferring to chair using right nancy walker with physical therapy. Pt was about to sit down in the chair when pt's foot began to slide and physical therapy assisted pt to the floor.

## 2024-11-24 NOTE — PHYSICAL THERAPY INITIAL EVALUATION ADULT - GENERAL OBSERVATIONS, REHAB EVAL
Pt found semi-supine in bed on 2N, left UE sling in place, Left LE brace in place, +condom cath, in NAD, agreeable to participate and eager to sit up at bedside chair. Pt is able to perform bed mobility with Natalee to edge of bed. Pt is able to perform sit<>stand transfers with ModA to stand at right Stan-walker. Pt ambulates 3 steps with ModA with right stan-walker towards the left towards bedside chair. As patient reached chair, bilateral feet with  socks began to slide forward from underneath the patient. Physical Therapist slowly guided patient to the ground onto his buttocks, back supported by the bedside chair. Patient reports no pain or injury, is safely seated on the floor with back supported by chair. CODE FALL called. Code Fall team arrived and safely transfers patient with Mike Mechanical Lift to return to bed. Pt returned to bed with call bell and phone in reach, bed alarm on, RASHAWN Quan is present and aware.

## 2024-11-24 NOTE — PHYSICAL THERAPY INITIAL EVALUATION ADULT - GAIT TRAINING, PT EVAL
Patient will be able to safely ambulate 25 feet with Rolling Walker by discharge from acute care setting.

## 2024-11-24 NOTE — PROGRESS NOTE ADULT - SUBJECTIVE AND OBJECTIVE BOX
Patient is a 64y old  Male who presents with a chief complaint of fall (24 Nov 2024 09:04)      SUBJECTIVE:   HPI:  63 y/o M w/ PMH of LLE paralysis 2/2 spinal cord injury in 1987 w/ LLE brace, p/w mechanical fall. Patient had a trip and fall earlier and came to ED, found to have an anterior shoulder dislocation. Patient was discharged home w/ sling. Patient then had another mechanical fall where he tripped when he went to get prescription and came to ED again. Patient has a LLE brace since 1987 since his previous spinal cord injury, and now with a sling. Patient is having difficutly with ambulation. He lives alone at home       11/24: pt seen and examined, states the sling is "bothering him." Pt subsequently had a controlled fall where he slid from chair slowly aided to the ground by PT. No headstrike, no LOC. PT seen again after fall and denies any complaints whatsoever Discussed with PT and attested to the same.       PSH: Spinal surgery     Social Hx: Denies tobacco / drugs, etoh - 2 drinks 2 times per week     Family Hx: Denies pertinent hx    (23 Nov 2024 23:06)        ICU Vital Signs Last 24 Hrs  T(C): 36.5 (24 Nov 2024 08:10), Max: 36.8 (23 Nov 2024 12:20)  T(F): 97.7 (24 Nov 2024 08:10), Max: 98.2 (23 Nov 2024 12:20)  HR: 53 (24 Nov 2024 08:10) (50 - 62)  BP: 147/57 (24 Nov 2024 08:10) (133/63 - 188/85)  BP(mean): 92 (23 Nov 2024 21:29) (92 - 92)  ABP: --  ABP(mean): --  RR: 18 (24 Nov 2024 08:10) (16 - 20)  SpO2: 95% (24 Nov 2024 08:10) (95% - 99%)    O2 Parameters below as of 24 Nov 2024 08:10  Patient On (Oxygen Delivery Method): room air                PHYSICAL EXAM:    Constitutional: NAD, awake and alert,   HEENT: PERR, EOMI, Normal Hearing, MMM  Neck: Soft and supple, No LAD, No JVD  Respiratory: Breath sounds are clear bilaterally, No wheezing, rales or rhonchi  Cardiovascular: S1 and S2, regular rate and rhythm, no Murmurs, gallops or rubs  Gastrointestinal: Bowel Sounds present, soft, nontender, nondistended, no guarding, no rebound  Extremities: No peripheral edema  Vascular: 2+ peripheral pulses  Neurological: A/O x 3, no focal deficits  Musculoskeletal: 4/5 LUE with atrophic changes to left hand. Decreased rom. Denies paresthesias  Skin: No rashes    MEDICATIONS:  MEDICATIONS  (STANDING):  cefTRIAXone Injectable. 1000 milliGRAM(s) IV Push every 24 hours  heparin   Injectable 5000 Unit(s) SubCutaneous every 12 hours  influenza  Vaccine (HIGH DOSE) 0.5 milliLiter(s) IntraMuscular once      LABS: All Labs Reviewed:                        14.1   9.07  )-----------( 209      ( 24 Nov 2024 07:00 )             42.5     11-24    141  |  112[H]  |  19  ----------------------------<  111[H]  3.7   |  25  |  0.70    Ca    8.4[L]      24 Nov 2024 07:00    TPro  6.5  /  Alb  3.1[L]  /  TBili  0.6  /  DBili  x   /  AST  28  /  ALT  28  /  AlkPhos  49  11-24    PT/INR - ( 24 Nov 2024 07:00 )   PT: 11.8 sec;   INR: 1.00 ratio         PTT - ( 24 Nov 2024 07:00 )  PTT:33.5 sec          Blood Culture:     RADIOLOGY/EKG: reviewed

## 2024-11-24 NOTE — PHYSICAL THERAPY INITIAL EVALUATION ADULT - LEVEL OF INDEPENDENCE, REHAB EVAL
minimum assist (75% patients effort) Detail Level: Simple Render Risk Assessment In Note?: no Additional Notes: Originally bx 9/18/2019 - metastatic consistent as spread from breast primary. Present in lymphatic/vascular spaces. This was shortly after breast cancer dx and double mastectomy. \\nShe has been in remission x 4 years. \\n\\nNotes slight discoloration similar to previous rash. Recently had a CT done in ER for shoulder pain. Showed no abnormalities. She is awaiting follow up with oncology next week. \\n\\nConsulted with MD, will proceed with punch bx. Will fax pathology to oncology.

## 2024-11-24 NOTE — CONSULT NOTE ADULT - SUBJECTIVE AND OBJECTIVE BOX
Patient is a 64y old  Male who presents with a chief complaint of fall (23 Nov 2024 23:06)      HPI:  63 y/o M w/ PMH of LLE paralysis 2/2 spinal cord injury in 1987 w/ LLE brace, p/w mechanical fall. Patient had a trip and fall earlier and came to ED, found to have an anterior shoulder dislocation. Patient was discharged home w/ sling. Patient then had another mechanical fall where he tripped when he went to get prescription and came to ED again. Patient has a LLE brace since 1987 since his previous spinal cord injury, and now with a sling. Patient is having difficutly with ambulation. He lives alone at home   Cardiology consulted for sinus bradycardia on ECG.       PSH: Spinal surgery     Social Hx: Denies tobacco / drugs, etoh - 2 drinks 2 times per week     Family Hx: Denies pertinent hx    (23 Nov 2024 23:06)      PAST MEDICAL & SURGICAL HISTORY:  Deaf            Allergies    No Known Allergies    Intolerances        MEDICATIONS  (STANDING):  cefTRIAXone Injectable. 1000 milliGRAM(s) IV Push every 24 hours  heparin   Injectable 5000 Unit(s) SubCutaneous every 12 hours  influenza  Vaccine (HIGH DOSE) 0.5 milliLiter(s) IntraMuscular once    MEDICATIONS  (PRN):  acetaminophen     Tablet .. 650 milliGRAM(s) Oral every 6 hours PRN Temp greater or equal to 38C (100.4F), Mild Pain (1 - 3)  oxyCODONE    IR 5 milliGRAM(s) Oral every 6 hours PRN Moderate Pain (4 - 6)      FAMILY HISTORY:      SOCIAL HISTORY  Tobacco use: denies    REVIEW OF SYSTEMS:  Unremarkable except as described in HPI      Vital Signs Last 24 Hrs  T(C): 36.5 (24 Nov 2024 08:10), Max: 36.8 (23 Nov 2024 12:20)  T(F): 97.7 (24 Nov 2024 08:10), Max: 98.2 (23 Nov 2024 12:20)  HR: 53 (24 Nov 2024 08:10) (50 - 62)  BP: 147/57 (24 Nov 2024 08:10) (133/63 - 188/85)  BP(mean): 92 (23 Nov 2024 21:29) (92 - 92)  RR: 18 (24 Nov 2024 08:10) (16 - 20)  SpO2: 95% (24 Nov 2024 08:10) (95% - 99%)    Parameters below as of 24 Nov 2024 08:10  Patient On (Oxygen Delivery Method): room air        Daily Height in cm: 177.8 (23 Nov 2024 18:14)    Daily     I&O's Detail    23 Nov 2024 07:01  -  24 Nov 2024 07:00  --------------------------------------------------------  IN:  Total IN: 0 mL    OUT:    Voided (mL): 700 mL  Total OUT: 700 mL    Total NET: -700 mL          PHYSICAL EXAM:  Appearance: No distress  HEENT:   Normal oral mucosa  Cardiovascular: Normal S1 S2, No JVD, No cardiac murmurs, No carotid bruits, No peripheral edema  Respiratory: Lungs clear to auscultation	  Psychiatry: A & O x 3, Mood & affect appropriate  Gastrointestinal:  Soft, Non-tender, + BS, no bruits	  Skin: No rashes, No ecchymoses, No cyanosis  Extremities: Normal range of motion, No clubbing, cyanosis or edema  Vascular: Peripheral pulses palpable 2+ bilaterally          ECG: Sinus bradycardia    LABS:                        14.1   9.07  )-----------( 209      ( 24 Nov 2024 07:00 )             42.5     11-24    141  |  112[H]  |  19  ----------------------------<  111[H]  3.7   |  25  |  0.70    Ca    8.4[L]      24 Nov 2024 07:00    TPro  6.5  /  Alb  3.1[L]  /  TBili  0.6  /  DBili  x   /  AST  28  /  ALT  28  /  AlkPhos  49  11-24    PT/INR - ( 24 Nov 2024 07:00 )   PT: 11.8 sec;   INR: 1.00 ratio         PTT - ( 24 Nov 2024 07:00 )  PTT:33.5 sec  Thyroid Stimulating Hormone, Serum: 1.12 uU/mL (11-24-24 @ 07:00)          Admitting attending: Isai Marshall  Outpatient provider: Alena Kraus

## 2024-11-24 NOTE — PHYSICAL THERAPY INITIAL EVALUATION ADULT - PERTINENT HX OF CURRENT PROBLEM, REHAB EVAL
65 y/o M w/ PMH of LLE paralysis 2/2 spinal cord injury in 1987 w/ LLE brace, p/w mechanical fall. Patient had a trip and fall earlier and came to ED, found to have an anterior shoulder dislocation. Patient was discharged home w/ sling. Patient then had another mechanical fall where he tripped when he went to get prescription and came to ED again. Patient has a LLE brace since 1987 since his previous spinal cord injury, and now with a sling. Patient is having difficutly with ambulation. He lives alone at home 63 y/o M w/ PMH of LLE paralysis 2/2 spinal cord injury in 1987 w/ LLE brace, p/w mechanical fall. Patient had a trip and fall earlier and came to ED, found to have an anterior shoulder dislocation. Patient was discharged home w/ sling. Patient then had another mechanical fall where he tripped when he went to get prescription and came to ED again. Patient has a LLE brace since 1987 since his previous spinal cord injury, and now with a sling. Patient is having difficulty with ambulation. He lives alone at home

## 2024-11-24 NOTE — CONSULT NOTE ADULT - SUBJECTIVE AND OBJECTIVE BOX
64y RHD Male presents sp MF earlier today in hospital room while working with PT. Patient was admitted after sustaining L Anterior Shoulder dislocations sp MF x2 yesterday. Both times his shoulder was reduced in the ED. Admitted for frequent falls. Patient has hx of spinal cord injury (C7-T1 fx) back in the 80s, where he has experienced L sided weakness. Has chronic weakness in L hand and a chronic L foot drop. Denies any numbness/tingling in the LUE. Denies head strike/LOC/other orthopedic injuries at this time. Denies fever, chills, HA, dizziness, chest pain, SOB, N/V/D, urinary frequency, urgency, or incontinence.     PAST MEDICAL & SURGICAL HISTORY:  Deaf        Home Medications:    Allergies    No Known Allergies    Intolerances                              14.1   9.07  )-----------( 209      ( 24 Nov 2024 07:00 )             42.5     11-24    141  |  112[H]  |  19  ----------------------------<  111[H]  3.7   |  25  |  0.70    Ca    8.4[L]      24 Nov 2024 07:00    TPro  6.5  /  Alb  3.1[L]  /  TBili  0.6  /  DBili  x   /  AST  28  /  ALT  28  /  AlkPhos  49  11-24    PT/INR - ( 24 Nov 2024 07:00 )   PT: 11.8 sec;   INR: 1.00 ratio         PTT - ( 24 Nov 2024 07:00 )  PTT:33.5 sec  Urinalysis Basic - ( 24 Nov 2024 07:00 )    Color: x / Appearance: x / SG: x / pH: x  Gluc: 111 mg/dL / Ketone: x  / Bili: x / Urobili: x   Blood: x / Protein: x / Nitrite: x   Leuk Esterase: x / RBC: x / WBC x   Sq Epi: x / Non Sq Epi: x / Bacteria: x          Vital Signs Last 24 Hrs  T(C): 36.5 (24 Nov 2024 08:10), Max: 36.8 (23 Nov 2024 12:20)  T(F): 97.7 (24 Nov 2024 08:10), Max: 98.2 (23 Nov 2024 12:20)  HR: 53 (24 Nov 2024 08:10) (50 - 62)  BP: 147/57 (24 Nov 2024 08:10) (133/63 - 188/85)  BP(mean): 92 (23 Nov 2024 21:29) (92 - 92)  RR: 18 (24 Nov 2024 08:10) (16 - 20)  SpO2: 95% (24 Nov 2024 08:10) (95% - 99%)    Parameters below as of 24 Nov 2024 08:10  Patient On (Oxygen Delivery Method): room air        PHYSICAL EXAM  Gen: NAD  Resp: Unlabored breathing  LUE:  Skin intact, no erythema or edema  L hand - resting posture: 2nd digit extended, 3rd digit slightly flexed, 4th/5th digit flexed   SILT radial/median/ulnar/axillary  Motor + Ax, negative AIN/PIN/Ulnar, unable to flex elbow or extend wrist   shoulder ROM limited 2/2 pain,   +radial pulse, soft compartments.    Secondary Assessment:  NC/AT, NTTP of clavicles, NTTP of Pelvis  RUE: NTTP of Shoulder, Elbow, Wrist, Hand; NT with AROM/PROM of Shoulder, Elbow, Wrist, Hand; AIN/PIN/Med/Uln/Msc/Rad/Ax intact  LEs: Able to SLR, NT with Log Roll, NT with Heel Strike, NTTP of Hips, Knees, Ankles, Feet; NT with AROM/PROM of Hips, Knees, Ankles, Feet; Q/H/Gsc/TA/EHL/FHL intact       IMAGING:  XR L Shoulder: Humeral head well located in the GH joint, personal read     Assessment/Plan:  64y Male sp Anterior shoulder dislocation x 2, with new onset lack of elbow flexion and wrist extension, likely 2/2 Brachial plexus injury. May take prolonged period of time for nerve function to recover.    NWB LUE, Sling and swath, - shoulder immobilizer ordered   Will need nerve studies in 6 weeks  Pain control prn  DVT ppx: per primary   No acute orthopaedic surgical intervention indicated at this time. This patient is orthopaedically stable for discharge.   Patient to follow up with Dr. Kraus as an outpatient for further evaluation and management, call to schedule an appointment     Discussed with Dr. Kraus who is in agreement with above plan         64y RHD Male presents sp MF earlier today in hospital room while working with PT. Patient was admitted after sustaining L Anterior Shoulder dislocations sp MF x2 yesterday. Both times his shoulder was reduced in the ED. Admitted for frequent falls. Patient has hx of spinal cord injury (C7-T1 fx) back in the 80s, where he has experienced L sided weakness. Has chronic weakness in L hand and a chronic L foot drop. Denies any numbness/tingling in the LUE. Denies head strike/LOC/other orthopedic injuries at this time. Denies fever, chills, HA, dizziness, chest pain, SOB, N/V/D, urinary frequency, urgency, or incontinence.        PAST MEDICAL & SURGICAL HISTORY:  Deaf        Home Medications:    Allergies    No Known Allergies    Intolerances                              14.1   9.07  )-----------( 209      ( 24 Nov 2024 07:00 )             42.5     11-24    141  |  112[H]  |  19  ----------------------------<  111[H]  3.7   |  25  |  0.70    Ca    8.4[L]      24 Nov 2024 07:00    TPro  6.5  /  Alb  3.1[L]  /  TBili  0.6  /  DBili  x   /  AST  28  /  ALT  28  /  AlkPhos  49  11-24    PT/INR - ( 24 Nov 2024 07:00 )   PT: 11.8 sec;   INR: 1.00 ratio         PTT - ( 24 Nov 2024 07:00 )  PTT:33.5 sec  Urinalysis Basic - ( 24 Nov 2024 07:00 )    Color: x / Appearance: x / SG: x / pH: x  Gluc: 111 mg/dL / Ketone: x  / Bili: x / Urobili: x   Blood: x / Protein: x / Nitrite: x   Leuk Esterase: x / RBC: x / WBC x   Sq Epi: x / Non Sq Epi: x / Bacteria: x          Vital Signs Last 24 Hrs  T(C): 36.5 (24 Nov 2024 08:10), Max: 36.8 (23 Nov 2024 12:20)  T(F): 97.7 (24 Nov 2024 08:10), Max: 98.2 (23 Nov 2024 12:20)  HR: 53 (24 Nov 2024 08:10) (50 - 62)  BP: 147/57 (24 Nov 2024 08:10) (133/63 - 188/85)  BP(mean): 92 (23 Nov 2024 21:29) (92 - 92)  RR: 18 (24 Nov 2024 08:10) (16 - 20)  SpO2: 95% (24 Nov 2024 08:10) (95% - 99%)    Parameters below as of 24 Nov 2024 08:10  Patient On (Oxygen Delivery Method): room air        PHYSICAL EXAM  Gen: NAD  Resp: Unlabored breathing  LUE:  Skin intact, no erythema or edema  L hand - resting posture: 2nd digit extended, 3rd digit slightly flexed, 4th/5th digit flexed   SILT radial/median/ulnar/axillary  Motor + Ax, negative AIN/PIN/Ulnar, unable to flex elbow or extend wrist   shoulder ROM limited 2/2 pain,   +radial pulse, soft compartments.    Secondary Assessment:  NC/AT, NTTP of clavicles, NTTP of Pelvis  RUE: NTTP of Shoulder, Elbow, Wrist, Hand; NT with AROM/PROM of Shoulder, Elbow, Wrist, Hand; AIN/PIN/Med/Uln/Msc/Rad/Ax intact  LEs: Able to SLR, NT with Log Roll, NT with Heel Strike, NTTP of Hips, Knees, Ankles, Feet; NT with AROM/PROM of Hips, Knees, Ankles, Feet; Q/H/Gsc/TA/EHL/FHL intact       IMAGING:  XR L Shoulder: Humeral head well located in the GH joint, personal read     Assessment/Plan:  64y Male sp Anterior shoulder dislocation x 2, with new onset lack of elbow flexion and wrist extension, likely 2/2 Brachial plexus injury. May take prolonged period of time for nerve function to recover.    NWB LUE, Sling and swath, - shoulder immobilizer ordered   Will need nerve studies in 6 weeks  Pain control prn  DVT ppx: per primary   No acute orthopaedic surgical intervention indicated at this time. This patient is orthopaedically stable for discharge.   Patient to follow up with Dr. Kraus as an outpatient for further evaluation and management, call to schedule an appointment     Discussed with Dr. Kraus who is in agreement with above plan

## 2024-11-24 NOTE — PROGRESS NOTE ADULT - ASSESSMENT
63 y/o M w/ PMH of LLE paralysis 2/2 spinal cord injury in  w/ LLE brace, p/w mechanical fall    *Anterior shoulder dislocation s/p mechanical fall  *Controlled fall on   - Repeat LUE xray  - neurovascularly in tact  - no indication for CTH , no headstrike or loc.   - PT landed softly and controlled on buttocks  -Fall precautions  -PT consult  -Pain control  -F/u ortho   -Social Work consult     *Bradycardia  - dc tele  - pt asymptomatic  -Cardio consult appreciated  -TSH : normal  - pt to f/u with cardiology in 4 weeks  - no changes to meds  -EK bpm, incomplete RBBB   -Echo in progess    *UTI  -CeftriaxoneD#1  -F/u urine cx     *Advance Directives  -Denies having advance care directives in place     *DVT ppx  -Heparin SubQ

## 2024-11-24 NOTE — PROVIDER CONTACT NOTE (FALL NOTIFICATION) - BACKGROUND
Sp skiing accident in 1987 resulting in left sided paralysis, left knee brace in place for supportive tx. Pt admitted for sp fall x 2, left shoulder dislocation. S/p skiing accident in 1987 resulting in left sided paralysis, left knee brace in place for supportive tx. Pt admitted for s/p fall x 2, left shoulder joint dislocation.

## 2024-11-25 ENCOUNTER — TRANSCRIPTION ENCOUNTER (OUTPATIENT)
Age: 64
End: 2024-11-25

## 2024-11-25 ENCOUNTER — RESULT REVIEW (OUTPATIENT)
Age: 64
End: 2024-11-25

## 2024-11-25 PROCEDURE — 99221 1ST HOSP IP/OBS SF/LOW 40: CPT

## 2024-11-25 PROCEDURE — 70450 CT HEAD/BRAIN W/O DYE: CPT | Mod: 26

## 2024-11-25 PROCEDURE — 93306 TTE W/DOPPLER COMPLETE: CPT | Mod: 26

## 2024-11-25 PROCEDURE — 99232 SBSQ HOSP IP/OBS MODERATE 35: CPT

## 2024-11-25 RX ORDER — KETOROLAC TROMETHAMINE 30 MG/ML
30 INJECTION INTRAMUSCULAR; INTRAVENOUS ONCE
Refills: 0 | Status: DISCONTINUED | OUTPATIENT
Start: 2024-11-25 | End: 2024-11-25

## 2024-11-25 RX ADMIN — Medication 5000 UNIT(S): at 21:10

## 2024-11-25 RX ADMIN — OXYCODONE HYDROCHLORIDE 5 MILLIGRAM(S): 30 TABLET ORAL at 21:09

## 2024-11-25 RX ADMIN — OXYCODONE HYDROCHLORIDE 5 MILLIGRAM(S): 30 TABLET ORAL at 21:39

## 2024-11-25 RX ADMIN — KETOROLAC TROMETHAMINE 30 MILLIGRAM(S): 30 INJECTION INTRAMUSCULAR; INTRAVENOUS at 02:55

## 2024-11-25 RX ADMIN — KETOROLAC TROMETHAMINE 30 MILLIGRAM(S): 30 INJECTION INTRAMUSCULAR; INTRAVENOUS at 03:25

## 2024-11-25 RX ADMIN — Medication 1000 MILLIGRAM(S): at 20:00

## 2024-11-25 RX ADMIN — Medication 5000 UNIT(S): at 09:43

## 2024-11-25 RX ADMIN — OXYCODONE HYDROCHLORIDE 5 MILLIGRAM(S): 30 TABLET ORAL at 00:02

## 2024-11-25 NOTE — PROGRESS NOTE ADULT - SUBJECTIVE AND OBJECTIVE BOX
Patient is a 64y old  Male who presents with a chief complaint of fall (2024 09:04)      SUBJECTIVE:   HPI:  63 y/o M w/ PMH of LLE paralysis 2/2 spinal cord injury in  w/ LLE brace, p/w mechanical fall. Patient had a trip and fall earlier and came to ED, found to have an anterior shoulder dislocation. Patient was discharged home w/ sling. Patient then had another mechanical fall where he tripped when he went to get prescription and came to ED again. Patient has a LLE brace since  since his previous spinal cord injury, and now with a sling. Patient is having difficutly with ambulation. He lives alone at home       : pt seen and examined, extremely [place],   awake, alert,  seen by PT, minnie po, no n/v    Vital Signs Last 24 Hrs  T(C): 36.5 (2024 07:47), Max: 36.9 (2024 00:00)  T(F): 97.7 (2024 07:47), Max: 98.4 (2024 00:00)  HR: 55 (2024 07:47) (55 - 60)  BP: 145/62 (2024 07:47) (142/58 - 145/62)  BP(mean): --  RR: 18 (2024 07:47) (18 - 18)  SpO2: 95% (2024 07:47) (93% - 95%)    Parameters below as of 2024 07:47  Patient On (Oxygen Delivery Method): room air    PHYSICAL EXAM:    Constitutional: NAD, awake and alert,   HEENT: PERR, EOMI, Normal Hearing, MMM  Neck: Soft and supple, No LAD, No JVD  Respiratory: Breath sounds are clear bilaterally, No wheezing, rales or rhonchi  Cardiovascular: S1 and S2, regular rate and rhythm, no Murmurs, gallops or rubs  Gastrointestinal: Bowel Sounds present, soft, nontender, nondistended, no guarding, no rebound  Extremities: No peripheral edema  Vascular: 2+ peripheral pulses  Neurological: A/O x 3, no focal deficits  Musculoskeletal: 4/5 LUE with atrophic changes to left hand. Decreased rom. Denies paresthesias, RUE with notes essential tremors  Skin: No rashes    MEDICATIONS  (STANDING):  cefTRIAXone Injectable. 1000 milliGRAM(s) IV Push every 24 hours  heparin   Injectable 5000 Unit(s) SubCutaneous every 12 hours  influenza  Vaccine (HIGH DOSE) 0.5 milliLiter(s) IntraMuscular once    MEDICATIONS  (PRN):  acetaminophen     Tablet .. 650 milliGRAM(s) Oral every 6 hours PRN Temp greater or equal to 38C (100.4F), Mild Pain (1 - 3)  oxyCODONE    IR 5 milliGRAM(s) Oral every 6 hours PRN Moderate Pain (4 - 6)      LABS: All Labs Reviewed:                 Vital Signs Last 24 Hrs  T(C): 36.5 (24 @ 07:47), Max: 36.9 (24 @ 00:00)  T(F): 97.7 (24 @ 07:47), Max: 98.4 (24 @ 00:00)  HR: 55 (24 @ 07:47) (55 - 60)  BP: 145/62 (24 @ 07:47) (142/58 - 145/62)  BP(mean): --  RR: 18 (24 @ 07:47) (18 - 18)  SpO2: 95% (24 @ 07:47) (93% - 95%)        < from: CT Head No Cont (24 @ 14:28) >    ACC: [id number] EXAM:  CT BRAIN   ORDERED BY: WILLIAM BERGERON     PROCEDURE DATE:  2024          INTERPRETATION:  CLINICAL INDICATIONS:  fall, RUE essential tremor, r/o   cva    COMPARISON: None.    TECHNIQUE: Noncontrast CT of the head. Multiplanar reformations are   submitted.    FINDINGS: Chronic areas of encephalomalacia and gliosis and bilateral   anterior inferior frontal lobes, bilateral parietal lobes. Moderate   cerebellar volume loss. Chronic left basal ganglia lacunar infarct on   image 17 of series 2. Right frontal calvarial justin hole. Chronic right   parietal craniotomy defect.  There is periventricular and subcortical white matter hypodensity without   mass effect, nonspecific, likely representing mild to moderate chronic   microvascular ischemic changes. There is no compelling evidence for an   acute transcortical infarction. There is no evidence of mass, mass   effect, midline shift or extra-axial fluid collection. The lateral   ventricles and cortical sulci are age-appropriate in size and   configuration.     Near-complete opacification the right maxillary sinus with mucosal   thickening. The orbits, mastoid air cells and visualized paranasal   sinuses are unremarkable. The calvarium is otherwise intact.    Consider MRI as clinically warranted.    IMPRESSION:  Mild to moderate chronic microvascular changes without   evidence of an acute transcortical infarction or hemorrhage. Chronic   changes.        63 y/o M w/ PMH of LLE paralysis 2/2 spinal cord injury in  w/ LLE brace, p/w mechanical fall    *Anterior shoulder dislocation s/p mechanical fall  *Controlled fall on   - neurovascularly in tact  -Fall precautions  -PT eval: [t high fall risk  -Pain control  -F/u ortho   -Social Work consult     *Bradycardia  - dc tele  - pt asymptomatic  -Cardio consult appreciated  -TSH : normal  - pt to f/u with cardiology in 4 weeks  - no changes to meds  -EK bpm, incomplete RBBB   -Echo in progess    # RUE new onset tremors   noted on exam   neurology consult   CT brain    *UTI  -CeftriaxoneD#2  -F/u urine cx     *Advance Directives  -Denies having advance care directives in place     *DVT ppx  -Heparin SubQ     # Spoke with mendez Lanier per pt request.

## 2024-11-25 NOTE — OCCUPATIONAL THERAPY INITIAL EVALUATION ADULT - GENERAL OBSERVATIONS, REHAB EVAL
Pt found semi-supine in bed on 2N, left UE shoulder immobilizer in place, Left LE brace in place, +condom cath, in NAD, agreeable to OT IE.

## 2024-11-25 NOTE — OCCUPATIONAL THERAPY INITIAL EVALUATION ADULT - PERTINENT HX OF CURRENT PROBLEM, REHAB EVAL
65 y/o M w/ PMH of LLE paralysis 2/2 spinal cord injury in 1987 w/ LLE brace, p/w mechanical fall. Patient had a trip and fall earlier and came to ED, found to have an anterior shoulder dislocation. Patient was discharged home w/ sling. Patient then had another mechanical fall where he tripped when he went to get prescription and came to ED again. Patient has a LLE brace since 1987 since his previous spinal cord injury, and now with a sling. Patient is having difficulty with ambulation. Per ortho PN: Anterior shoulder dislocation x 2, with new onset lack of elbow flexion and wrist extension, likely 2/2 Brachial plexus injury. Pt on 11/24- Code fall overhead. Dr. Ferreira and Orthopedic Team notified and aware. Shoulder x-ray x2 ordered and resulted negative.

## 2024-11-25 NOTE — CONSULT NOTE ADULT - ASSESSMENT
ASSESSMENT/PLAN  64yMale with functional deficits after  Pain - Tylenol  DVT PPX - SCDs  Rehab -     Pending evaluation     Recommend ACUTE inpatient rehabilitation for the functional deficits consisting of 3 hours of therapy/day & 24 hour RN/daily PMR physician for comorbid medical management. Patient will be able to tolerate 3 hours a day.   Recommend [facility], patient DOES NOT meet acute inpatient rehabilitation criteria. Patient needs a more prolonged stay to achieve transition to community.    Expect patient to achieve functional goals for DC HOME with OUTPATIENT   Expect patient to achieve functional goals for DC HOME with HOME CARE   Follow up with CONCUSSION PROGRAM - Call [phone] for an appointment    Will continue to follow. Functional progress will determine ongoing rehab dispo recommendations, which may change.    Continue bedside therapy as well as OOB throughout the day with mobilization by staff to maintain cardiopulmonary function and prevention of secondary complications related to debility.    ASSESSMENT/PLAN  65 y/o M w/ PMH of LLE paralysis 2/2 spinal cord injury in 1987 w/ LLE brace, p/w mechanical fall. Patient had a trip and fall earlier and came to ED, found to have an anterior shoulder dislocation.   Pain - Tylenol  DVT PPX - SCDs  Rehab -      His neurological deficits have been since 1987. He has left sided hemiparesis. He has multiple falls.    Recommend [facility], patient DOES NOT meet acute inpatient rehabilitation criteria. Patient needs a more prolonged stay to achieve transition to community.     Will continue to follow. Functional progress will determine ongoing rehab dispo recommendations, which may change.    Continue bedside therapy as well as OOB throughout the day with mobilization by staff to maintain cardiopulmonary function and prevention of secondary complications related to debility.

## 2024-11-25 NOTE — OCCUPATIONAL THERAPY INITIAL EVALUATION ADULT - COGNITIVE, VISUAL PERCEPTUAL, OT EVAL
Pt will recall/adhere to NWB/ROM precautions for L shoulder with 100% accuracy in 3-5 tx sessions.
Admitted

## 2024-11-25 NOTE — OCCUPATIONAL THERAPY INITIAL EVALUATION ADULT - ADDITIONAL COMMENTS
Pt reports he resides in a  alone with 2 CHARLOTTE and then 1 step inside to the den/garage. Pt reports being (I) with all ADL/IADL tasks, walked with [place] crutches. +driving, +working. Pt has a walk in shower +SC, with a standard toilet.

## 2024-11-25 NOTE — OCCUPATIONAL THERAPY INITIAL EVALUATION ADULT - NSACTIVITYREC_GEN_A_OT
Pt presents with impaired balance, endurance and muscle strength that will benefit from skilled OT to improve independence in ADLs, reduce fall risk and chance for readmission. Pt educated on NWB and ROM precautions for LUE with fair understanding reported.

## 2024-11-25 NOTE — OCCUPATIONAL THERAPY INITIAL EVALUATION ADULT - RANGE OF MOTION EXAMINATION, UPPER EXTREMITY
RUE: WFL, LUE: NT at shoulder, pt in shoulder immobilizer- r/o elbow pain, and limited AROM at wrist/hand, PROM pt with r/o pain

## 2024-11-25 NOTE — PROGRESS NOTE ADULT - ASSESSMENT
65 y/o M w/ PMH of LLE paralysis 2/2 spinal cord injury in  w/ LLE brace, p/w mechanical fall    *Anterior shoulder dislocation s/p mechanical fall  *Controlled fall on   - Repeat LUE xray  - neurovascularly in tact  - no indication for CTH , no headstrike or loc.   - PT landed softly and controlled on buttocks  -Fall precautions  -PT consult  -Pain control  -F/u ortho   -Social Work consult     *Bradycardia  - dc tele  - pt asymptomatic  -Cardio consult appreciated  -TSH : normal  - pt to f/u with cardiology in 4 weeks  - no changes to meds  -EK bpm, incomplete RBBB   -Echo in progess    *UTI  -CeftriaxoneD#1  -F/u urine cx     *Advance Directives  -Denies having advance care directives in place     *DVT ppx  -Heparin SubQ

## 2024-11-25 NOTE — OCCUPATIONAL THERAPY INITIAL EVALUATION ADULT - SHORT TERM MEMORY, REHAB EVAL
limited by hearing so increased cues needed/writing words down, but pt scored a 9/15 on BIMs indicating an impairment/impaired

## 2024-11-25 NOTE — CONSULT NOTE ADULT - SUBJECTIVE AND OBJECTIVE BOX
64yM was admitted on     Patient is a 64y old  Male who presents with a chief complaint of fall (2024 11:35)    HPI:  65 y/o M w/ PMH of LLE paralysis 2/2 spinal cord injury in  w/ LLE brace, p/w mechanical fall. Patient had a trip and fall earlier and came to ED, found to have an anterior shoulder dislocation. Patient was discharged home w/ sling. Patient then had another mechanical fall where he tripped when he went to get prescription and came to ED again. Patient has a LLE brace since  since his previous spinal cord injury, and now with a sling. Patient is having difficutly with ambulation. He lives alone at home     Followed by Orthopedic service  64y Male sp Anterior shoulder dislocation x 2, with new onset lack of elbow flexion and wrist extension, likely 2/2 Brachial plexus injury. May take prolonged period of time for nerve function to recover.    NWB LUE, Sling and swath, - shoulder immobilizer ordered   Will need nerve studies in 6 weeks  Pain control prn  DVT ppx: per primary   No acute orthopaedic surgical intervention indicated at this time. This patient is orthopaedically stable for discharge.       Acute medical issues   Anterior shoulder dislocation s/p mechanical fall  -Fall precautions  -PT consult  -Pain control  -F/u ortho   -Social Work consult     *Bradycardia  -Remote tele  -Cardio consult   -TSH   -EK bpm, incomplete RBBB   -Echo     *UTI  -Ceftriaxone  -F/u urine cx     *Advance Directives  -Denies having advance care directives in place     *DVT ppx  -Heparin SubQ       PSH: Spinal surgery     Social Hx: Denies tobacco / drugs, etoh - 2 drinks 2 times per week     Family Hx: Denies pertinent hx    (2024 23:06)      Imaging performed:  Left shoulder xray 2024-Humeral head lies within glenoid fossae. No dislocation.      REVIEW OF SYSTEMS  Constitutional - No fever, No weight loss, No fatigue  HEENT - No eye pain, No visual disturbances, No difficulty hearing, No tinnitus, No vertigo, No neck pain  Respiratory - No cough, No wheezing, No shortness of breath  Cardiovascular - No chest pain, No palpitations  Gastrointestinal - No abdominal pain, No nausea, No vomiting, No diarrhea, No constipation  Genitourinary - No dysuria, No frequency, No hematuria, No incontinence  Neurological - No headaches, No memory loss, No loss of strength, No numbness, No tremors  Skin - No itching, No rashes, No lesions   Endocrine - No temperature intolerance  Musculoskeletal - No joint pain, No joint swelling, No muscle pain  Psychiatric - No depression, No anxiety    VITALS  T(C): 36.5 (24 @ 07:47), Max: 36.9 (24 @ 00:00)  HR: 55 (24 @ 07:47) (55 - 60)  BP: 145/62 (24 @ 07:47) (142/58 - 145/62)  RR: 18 (24 @ 07:47) (18 - 18)  SpO2: 95% (24 @ 07:47) (93% - 95%)  Wt(kg): --    PAST MEDICAL & SURGICAL HISTORY  Deaf        SOCIAL HISTORY - as per documentation/history  Smoking - None  EtOH - None  Drugs - None    FUNCTIONAL HISTORY  Lives   Independent    CURRENT FUNCTIONAL STATUS      FAMILY HISTORY       RECENT LABS - Reviewed  CBC Full  -  ( 2024 07:00 )  WBC Count : 9.07 K/uL  RBC Count : 4.22 M/uL  Hemoglobin : 14.1 g/dL  Hematocrit : 42.5 %  Platelet Count - Automated : 209 K/uL  Mean Cell Volume : 100.7 fl  Mean Cell Hemoglobin : 33.4 pg  Mean Cell Hemoglobin Concentration : 33.2 g/dL  Auto Neutrophil # : 6.44 K/uL  Auto Lymphocyte # : 1.35 K/uL  Auto Monocyte # : 1.20 K/uL  Auto Eosinophil # : 0.03 K/uL  Auto Basophil # : 0.02 K/uL  Auto Neutrophil % : 71.1 %  Auto Lymphocyte % : 14.9 %  Auto Monocyte % : 13.2 %  Auto Eosinophil % : 0.3 %  Auto Basophil % : 0.2 %        141  |  112[H]  |  19  ----------------------------<  111[H]  3.7   |  25  |  0.70    Ca    8.4[L]      2024 07:00    TPro  6.5  /  Alb  3.1[L]  /  TBili  0.6  /  DBili  x   /  AST  28  /  ALT  28  /  AlkPhos  49      Urinalysis Basic - ( 2024 07:00 )    Color: x / Appearance: x / SG: x / pH: x  Gluc: 111 mg/dL / Ketone: x  / Bili: x / Urobili: x   Blood: x / Protein: x / Nitrite: x   Leuk Esterase: x / RBC: x / WBC x   Sq Epi: x / Non Sq Epi: x / Bacteria: x        ALLERGIES  No Known Allergies      MEDICATIONS   acetaminophen     Tablet .. 650 milliGRAM(s) Oral every 6 hours PRN  cefTRIAXone Injectable. 1000 milliGRAM(s) IV Push every 24 hours  heparin   Injectable 5000 Unit(s) SubCutaneous every 12 hours  influenza  Vaccine (HIGH DOSE) 0.5 milliLiter(s) IntraMuscular once  oxyCODONE    IR 5 milliGRAM(s) Oral every 6 hours PRN      ----------------------------------------------------------------------------------------  PHYSICAL EXAM  Constitutional - NAD, Comfortable  HEENT - NCAT, EOMI  Neck - Supple, No limited ROM  Chest - Breathing comfortably, No wheezing  Cardiovascular - S1S2   Abdomen - Soft   Extremities - No C/C/E, No calf tenderness   Neurologic Exam -                    Cognitive - AAO to self, place, date, year, situation     Communication - Fluent, No dysarthria     Cranial Nerves - CN 2-12 intact     Motor - No focal deficits                    LEFT    UE - ShAB 5/5, EF 5/5, EE 5/5, WE 5/5,  5/5                    RIGHT UE - ShAB 5/5, EF 5/5, EE 5/5, WE 5/5,  5/5                    LEFT    LE - HF 5/5, KE 5/5, DF 5/5, PF 5/5                    RIGHT LE - HF 5/5, KE 5/5, DF 5/5, PF 5/5        Sensory - Intact to LT     Reflexes - DTR Intact, No primitive reflexive     Coordination - FTN intact     OculoVestibular - No saccades, No nystagmus, VOR         Balance - WNL Static  Psychiatric - Mood stable, Affect WNL  ----------------------------------------------------------------------------------------   64yM was admitted on     Patient is a 64y old  Male who presents with a chief complaint of fall (2024 11:35)    HPI  63 y/o M w/ PMH of LLE paralysis 2/2 spinal cord injury in  w/ LLE brace, p/w mechanical fall. Patient had a trip and fall earlier and came to ED, found to have an anterior shoulder dislocation. Patient was discharged home w/ sling. Patient then had another mechanical fall where he tripped when he went to get prescription and came to ED again. Patient has a LLE brace since  since his previous spinal cord injury, and now with a sling. Patient is having difficutly with ambulation. He lives alone at home     Followed by Orthopedic service  64y Male sp Anterior shoulder dislocation x 2, with new onset lack of elbow flexion and wrist extension, likely 2/2 Brachial plexus injury. May take prolonged period of time for nerve function to recover.    NWB LUE, Sling and swath, - shoulder immobilizer ordered   Will need nerve studies in 6 weeks  Pain control prn  DVT ppx: per primary   No acute orthopaedic surgical intervention indicated at this time. This patient is orthopaedically stable for discharge.       Acute medical issues   Anterior shoulder dislocation s/p mechanical fall  -Fall precautions  -PT consult  -Pain control  -F/u ortho   -Social Work consult     *Bradycardia  -Remote tele  -Cardio consult   -TSH   -EK bpm, incomplete RBBB   -Echo     *UTI  -Ceftriaxone  -F/u urine cx     *Advance Directives  -Denies having advance care directives in place     *DVT ppx  -Heparin SubQ       PSH: Spinal surgery     Social Hx: Denies tobacco / drugs, etoh - 2 drinks 2 times per week     Family Hx: Denies pertinent hx    (2024 23:06)      Imaging performed:  Left shoulder xray 2024-Humeral head lies within glenoid fossae. No dislocation.      REVIEW OF SYSTEMS  Constitutional - No fever, No weight loss, No fatigue  HEENT - No eye pain, No visual disturbances, No difficulty hearing, No tinnitus, No vertigo, No neck pain  Respiratory - No cough, No wheezing, No shortness of breath  Cardiovascular - No chest pain, No palpitations  Gastrointestinal - No abdominal pain, No nausea, No vomiting, No diarrhea, No constipation  Genitourinary - No dysuria, No frequency, No hematuria, No incontinence  Neurological - No headaches, No memory loss, No loss of strength, No numbness, No tremors  Skin - No itching, No rashes, No lesions   Endocrine - No temperature intolerance  Musculoskeletal - No joint pain, No joint swelling, No muscle pain  Psychiatric - No depression, No anxiety    VITALS  T(C): 36.5 (24 @ 07:47), Max: 36.9 (24 @ 00:00)  HR: 55 (24 @ 07:47) (55 - 60)  BP: 145/62 (24 @ 07:47) (142/58 - 145/62)  RR: 18 (24 @ 07:47) (18 - 18)  SpO2: 95% (24 @ 07:47) (93% - 95%)  Wt(kg): --    PAST MEDICAL & SURGICAL HISTORY  Deaf      SOCIAL HISTORY - as per documentation/history  Smoking - None  EtOH - None  Drugs - None    FUNCTIONAL HISTORY  Lives alone in a home [address] [place]. 0 steps in the home. Ambulates with a lofstrand crutch    CURRENT FUNCTIONAL STATUS  Stands with minimun assist of 1 person      RECENT LABS - Reviewed  CBC Full  -  ( 2024 07:00 )  WBC Count : 9.07 K/uL  RBC Count : 4.22 M/uL  Hemoglobin : 14.1 g/dL  Hematocrit : 42.5 %  Platelet Count - Automated : 209 K/uL  Mean Cell Volume : 100.7 fl  Mean Cell Hemoglobin : 33.4 pg  Mean Cell Hemoglobin Concentration : 33.2 g/dL  Auto Neutrophil # : 6.44 K/uL  Auto Lymphocyte # : 1.35 K/uL  Auto Monocyte # : 1.20 K/uL  Auto Eosinophil # : 0.03 K/uL  Auto Basophil # : 0.02 K/uL  Auto Neutrophil % : 71.1 %  Auto Lymphocyte % : 14.9 %  Auto Monocyte % : 13.2 %  Auto Eosinophil % : 0.3 %  Auto Basophil % : 0.2 %        141  |  112[H]  |  19  ----------------------------<  111[H]  3.7   |  25  |  0.70    Ca    8.4[L]      2024 07:00    TPro  6.5  /  Alb  3.1[L]  /  TBili  0.6  /  DBili  x   /  AST  28  /  ALT  28  /  AlkPhos  49  11-24    Urinalysis Basic - ( 2024 07:00 )    Color: x / Appearance: x / SG: x / pH: x  Gluc: 111 mg/dL / Ketone: x  / Bili: x / Urobili: x   Blood: x / Protein: x / Nitrite: x   Leuk Esterase: x / RBC: x / WBC x   Sq Epi: x / Non Sq Epi: x / Bacteria: x    ALLERGIES  No Known Allergies      MEDICATIONS   acetaminophen     Tablet .. 650 milliGRAM(s) Oral every 6 hours PRN  cefTRIAXone Injectable. 1000 milliGRAM(s) IV Push every 24 hours  heparin   Injectable 5000 Unit(s) SubCutaneous every 12 hours  influenza  Vaccine (HIGH DOSE) 0.5 milliLiter(s) IntraMuscular once  oxyCODONE    IR 5 milliGRAM(s) Oral every 6 hours PRN      ----------------------------------------------------------------------------------------  PHYSICAL EXAM  Constitutional - NAD, Comfortable  HEENT - NCAT, EOMI  Neck - Supple, No limited ROM  Chest - Breathing comfortably, No wheezing  Cardiovascular - S1S2   Abdomen - Soft   Extremities - No C/C/E, No calf tenderness   Neurologic Exam -                    Cognitive - AAO to self, place, date, year, situation     Communication - Fluent, No dysarthria     Cranial Nerves - CN 2-12 intact     Motor - No focal deficits                    LEFT    UE - ShAB 0/5, EF 0/5, EE 0/5, WE 0/5,  0/5                    RIGHT UE - ShAB 5/5, EF 5/5, EE 5/5, WE 5/5,  5/5                    LEFT    LE - HF 1/5, KE 5NT/5, Knee immobilizer DF 0/5, PF 50/5                    RIGHT LE - HF 5/5, KE 5/5, DF 5/5, PF 5/5        Sensory - Intact to LT     Reflexes - DTR Intact, No primitive reflexive     Coordination - FTN intact     OculoVestibular - No saccades, No nystagmus, VOR         Balance - fair Static  Psychiatric - Mood stable, Affect WNL  ----------------------------------------------------------------------------------------

## 2024-11-26 LAB
-  CEFTAROLINE: SIGNIFICANT CHANGE UP
-  GENTAMICIN: SIGNIFICANT CHANGE UP
-  NITROFURANTOIN: SIGNIFICANT CHANGE UP
-  OXACILLIN: SIGNIFICANT CHANGE UP
-  PENICILLIN: SIGNIFICANT CHANGE UP
-  RIFAMPIN: SIGNIFICANT CHANGE UP
-  TETRACYCLINE: SIGNIFICANT CHANGE UP
-  TRIMETHOPRIM/SULFAMETHOXAZOLE: SIGNIFICANT CHANGE UP
-  VANCOMYCIN: SIGNIFICANT CHANGE UP
ANION GAP SERPL CALC-SCNC: 3 MMOL/L — LOW (ref 5–17)
BUN SERPL-MCNC: 22 MG/DL — SIGNIFICANT CHANGE UP (ref 7–23)
CALCIUM SERPL-MCNC: 8.9 MG/DL — SIGNIFICANT CHANGE UP (ref 8.5–10.1)
CHLORIDE SERPL-SCNC: 110 MMOL/L — HIGH (ref 96–108)
CO2 SERPL-SCNC: 26 MMOL/L — SIGNIFICANT CHANGE UP (ref 22–31)
CREAT SERPL-MCNC: 0.84 MG/DL — SIGNIFICANT CHANGE UP (ref 0.5–1.3)
CULTURE RESULTS: ABNORMAL
EGFR: 97 ML/MIN/1.73M2 — SIGNIFICANT CHANGE UP
GLUCOSE SERPL-MCNC: 124 MG/DL — HIGH (ref 70–99)
HCT VFR BLD CALC: 39 % — SIGNIFICANT CHANGE UP (ref 39–50)
HGB BLD-MCNC: 12.8 G/DL — LOW (ref 13–17)
MCHC RBC-ENTMCNC: 32.8 G/DL — SIGNIFICANT CHANGE UP (ref 32–36)
MCHC RBC-ENTMCNC: 33.4 PG — SIGNIFICANT CHANGE UP (ref 27–34)
MCV RBC AUTO: 101.8 FL — HIGH (ref 80–100)
METHOD TYPE: SIGNIFICANT CHANGE UP
ORGANISM # SPEC MICROSCOPIC CNT: ABNORMAL
ORGANISM # SPEC MICROSCOPIC CNT: SIGNIFICANT CHANGE UP
PLATELET # BLD AUTO: 196 K/UL — SIGNIFICANT CHANGE UP (ref 150–400)
POTASSIUM SERPL-MCNC: 3.2 MMOL/L — LOW (ref 3.5–5.3)
POTASSIUM SERPL-SCNC: 3.2 MMOL/L — LOW (ref 3.5–5.3)
RBC # BLD: 3.83 M/UL — LOW (ref 4.2–5.8)
RBC # FLD: 12.2 % — SIGNIFICANT CHANGE UP (ref 10.3–14.5)
SODIUM SERPL-SCNC: 139 MMOL/L — SIGNIFICANT CHANGE UP (ref 135–145)
SPECIMEN SOURCE: SIGNIFICANT CHANGE UP
WBC # BLD: 11.12 K/UL — HIGH (ref 3.8–10.5)
WBC # FLD AUTO: 11.12 K/UL — HIGH (ref 3.8–10.5)

## 2024-11-26 PROCEDURE — 99223 1ST HOSP IP/OBS HIGH 75: CPT

## 2024-11-26 PROCEDURE — 72125 CT NECK SPINE W/O DYE: CPT | Mod: 26

## 2024-11-26 PROCEDURE — 72128 CT CHEST SPINE W/O DYE: CPT | Mod: 26

## 2024-11-26 PROCEDURE — 72040 X-RAY EXAM NECK SPINE 2-3 VW: CPT | Mod: 26

## 2024-11-26 PROCEDURE — 73080 X-RAY EXAM OF ELBOW: CPT | Mod: 26,RT

## 2024-11-26 PROCEDURE — 99232 SBSQ HOSP IP/OBS MODERATE 35: CPT

## 2024-11-26 PROCEDURE — 72131 CT LUMBAR SPINE W/O DYE: CPT | Mod: 26

## 2024-11-26 RX ORDER — POTASSIUM CHLORIDE 600 MG/1
40 TABLET, EXTENDED RELEASE ORAL EVERY 4 HOURS
Refills: 0 | Status: COMPLETED | OUTPATIENT
Start: 2024-11-26 | End: 2024-11-26

## 2024-11-26 RX ORDER — ENOXAPARIN SODIUM 30 MG/.3ML
40 INJECTION SUBCUTANEOUS EVERY 24 HOURS
Refills: 0 | Status: DISCONTINUED | OUTPATIENT
Start: 2024-11-26 | End: 2024-12-02

## 2024-11-26 RX ADMIN — ACETAMINOPHEN 500MG 650 MILLIGRAM(S): 500 TABLET, COATED ORAL at 02:13

## 2024-11-26 RX ADMIN — POTASSIUM CHLORIDE 40 MILLIEQUIVALENT(S): 600 TABLET, EXTENDED RELEASE ORAL at 10:17

## 2024-11-26 RX ADMIN — POTASSIUM CHLORIDE 40 MILLIEQUIVALENT(S): 600 TABLET, EXTENDED RELEASE ORAL at 13:26

## 2024-11-26 RX ADMIN — OXYCODONE HYDROCHLORIDE 5 MILLIGRAM(S): 30 TABLET ORAL at 21:44

## 2024-11-26 RX ADMIN — ACETAMINOPHEN 500MG 650 MILLIGRAM(S): 500 TABLET, COATED ORAL at 02:43

## 2024-11-26 RX ADMIN — Medication 5000 UNIT(S): at 10:17

## 2024-11-26 RX ADMIN — ENOXAPARIN SODIUM 40 MILLIGRAM(S): 30 INJECTION SUBCUTANEOUS at 17:33

## 2024-11-26 RX ADMIN — OXYCODONE HYDROCHLORIDE 5 MILLIGRAM(S): 30 TABLET ORAL at 21:14

## 2024-11-26 RX ADMIN — Medication 1000 MILLIGRAM(S): at 21:15

## 2024-11-26 NOTE — CONSULT NOTE ADULT - CONSULT REASON
Evaluate for Acute Inpatient Rehab
recurrent falls
L Ant Shoulder Dislocation sp MF x2    Consult at 1000  Seen at 1015
bradycardia
s/p fall / hardware assesment

## 2024-11-26 NOTE — CONSULT NOTE ADULT - ASSESSMENT
63 y/o man PMH spinal cord injury in 1987 with left hemiparesis, walks with cane and LLE brace, p/w mechanical fall, L shoulder dislocation, LUE in a sling, c/o R elbow pain.  Suggest:  check X rays of cervical spine, R elbow, evaluate sx hardware  f/u with orthopedics  Pt

## 2024-11-26 NOTE — PROGRESS NOTE ADULT - SUBJECTIVE AND OBJECTIVE BOX
Patient is a 64y old  Male who presents with a chief complaint of fall (2024 09:04)      SUBJECTIVE:   HPI:  63 y/o M w/ PMH of LLE paralysis 2/2 spinal cord injury in  w/ LLE brace, p/w mechanical fall. Patient had a trip and fall earlier and came to ED, found to have an anterior shoulder dislocation. Patient was discharged home w/ sling. Patient then had another mechanical fall where he tripped when he went to get prescription and came to ED again. Patient has a LLE brace since  since his previous spinal cord injury, and now with a sling. Patient is having difficulty with ambulation. He lives alone at home       : pt seen and examined, extremely [place],   awake, alert,  seen by PT, minnie po, no n/v    Vital Signs Last 24 Hrs  T(C): 36.5 (2024 07:47), Max: 36.9 (2024 00:00)  T(F): 97.7 (2024 07:47), Max: 98.4 (2024 00:00)  HR: 55 (2024 07:47) (55 - 60)  BP: 145/62 (2024 07:47) (142/58 - 145/62)  BP(mean): --  RR: 18 (2024 07:47) (18 - 18)  SpO2: 95% (2024 07:47) (93% - 95%)    Parameters below as of 2024 07:47  Patient On (Oxygen Delivery Method): room air    PHYSICAL EXAM:    Constitutional: NAD, awake and alert,   HEENT: PERR, EOMI, Normal Hearing, MMM  Neck: Soft and supple, No LAD, No JVD  Respiratory: Breath sounds are clear bilaterally, No wheezing, rales or rhonchi  Cardiovascular: S1 and S2, regular rate and rhythm, no Murmurs, gallops or rubs  Gastrointestinal: Bowel Sounds present, soft, nontender, nondistended, no guarding, no rebound  Extremities: No peripheral edema  Vascular: 2+ peripheral pulses  Neurological: A/O x 3, no focal deficits  Musculoskeletal: 4/5 LUE with atrophic changes to left hand. Decreased rom. Denies paresthesias, RUE with notes essential tremors  Skin: No rashes    MEDICATIONS  (STANDING):  cefTRIAXone Injectable. 1000 milliGRAM(s) IV Push every 24 hours  heparin   Injectable 5000 Unit(s) SubCutaneous every 12 hours  influenza  Vaccine (HIGH DOSE) 0.5 milliLiter(s) IntraMuscular once    MEDICATIONS  (PRN):  acetaminophen     Tablet .. 650 milliGRAM(s) Oral every 6 hours PRN Temp greater or equal to 38C (100.4F), Mild Pain (1 - 3)  oxyCODONE    IR 5 milliGRAM(s) Oral every 6 hours PRN Moderate Pain (4 - 6)      LABS: All Labs Reviewed:                 Vital Signs Last 24 Hrs  T(C): 36.5 (24 @ 07:47), Max: 36.9 (24 @ 00:00)  T(F): 97.7 (24 @ 07:47), Max: 98.4 (24 @ 00:00)  HR: 55 (24 @ 07:47) (55 - 60)  BP: 145/62 (24 @ 07:47) (142/58 - 145/62)  BP(mean): --  RR: 18 (24 @ 07:47) (18 - 18)  SpO2: 95% (24 @ 07:47) (93% - 95%)        < from: CT Head No Cont (24 @ 14:28) >    ACC: [id number] EXAM:  CT BRAIN   ORDERED BY: WILLIAM BERGERON     PROCEDURE DATE:  2024          INTERPRETATION:  CLINICAL INDICATIONS:  fall, RUE essential tremor, r/o   cva    COMPARISON: None.    TECHNIQUE: Noncontrast CT of the head. Multiplanar reformations are   submitted.    FINDINGS: Chronic areas of encephalomalacia and gliosis and bilateral   anterior inferior frontal lobes, bilateral parietal lobes. Moderate   cerebellar volume loss. Chronic left basal ganglia lacunar infarct on   image 17 of series 2. Right frontal calvarial justin hole. Chronic right   parietal craniotomy defect.  There is periventricular and subcortical white matter hypodensity without   mass effect, nonspecific, likely representing mild to moderate chronic   microvascular ischemic changes. There is no compelling evidence for an   acute transcortical infarction. There is no evidence of mass, mass   effect, midline shift or extra-axial fluid collection. The lateral   ventricles and cortical sulci are age-appropriate in size and   configuration.     Near-complete opacification the right maxillary sinus with mucosal   thickening. The orbits, mastoid air cells and visualized paranasal   sinuses are unremarkable. The calvarium is otherwise intact.    Consider MRI as clinically warranted.    IMPRESSION:  Mild to moderate chronic microvascular changes without   evidence of an acute transcortical infarction or hemorrhage. Chronic   changes.        63 y/o M w/ PMH of LLE paralysis 2/2 spinal cord injury in  w/ LLE brace, p/w mechanical fall    *Anterior shoulder dislocation s/p mechanical fall  *Controlled fall on   - neurovascularly in tact  -Fall precautions  -PT eval: [t high fall risk  -Pain control  -F/u ortho   -Social Work consult     *Bradycardia  - dc tele  - pt asymptomatic  -Cardio consult appreciated  -TSH : normal  - pt to f/u with cardiology in 4 weeks  - no changes to meds  -EK bpm, incomplete RBBB   -Echo in progess    # RUE new onset tremors   noted on exam   neurology consult   CT brain    *UTI  -CeftriaxoneD#2  -F/u urine cx     *Advance Directives  -Denies having advance care directives in place     *DVT ppx  -Heparin SubQ     # Spoke with mendez Lanier per pt request.         Patient is a 64y old  Male who presents with a chief complaint of fall (2024 09:04)      SUBJECTIVE:   HPI:  63 y/o M w/ PMH of LLE paralysis 2/2 spinal cord injury in  w/ LLE brace, p/w mechanical fall. Patient had a trip and fall earlier and came to ED, found to have an anterior shoulder dislocation. Patient was discharged home w/ sling. Patient then had another mechanical fall where he tripped when he went to get prescription and came to ED again. Patient has a LLE brace since  since his previous spinal cord injury, and now with a sling. Patient is having difficulty with ambulation. He lives alone at home       : pt seen and examined, OOb to chair, extremely [place],   awake, alert,  seen by neuro,  denies any cp or sob    Vital Signs Last 24 Hrs  T(C): 37.1 (2024 07:27), Max: 37.1 (2024 07:27)  T(F): 98.8 (2024 07:27), Max: 98.8 (2024 07:27)  HR: 60 (2024 07:27) (60 - 65)  BP: 152/72 (2024 07:27) (152/72 - 160/60)  BP(mean): 86 (2024 16:05) (86 - 86)  RR: 18 (2024 07:27) (18 - 18)  SpO2: 95% (2024 07:27) (94% - 96%)    Parameters below as of 2024 07:27  Patient On (Oxygen Delivery Method): room air    PHYSICAL EXAM:    GENERAL: Comfortable, no acute distress   HEAD:  Normocephalic, atraumatic  EYES: EOMI, PERRLA  HEENT: Moist mucous membranes  NECK: Supple, No JVD  NERVOUS SYSTEM:  Alert & Oriented X3, Motor Strength 5/5 RUE/RLE: LUE/LLE paralysis from old injury, wear brace to LLE  CHEST/LUNG: Clear to auscultation bilaterally  HEART: Regular rate and rhythm  ABDOMEN: Soft, non tender, Nondistended, Bowel sounds present  GENITOURINARY: Voiding, no palpable bladder  EXTREMITIES:   No clubbing, cyanosis, or edema  MUSCULOSKELETAL- left arm in sling, radial Pulse +, fingers warm and mobile, unable to make fist with left hand d/t old injury, RUE with jerky movements at times, C/P right elbow pain  SKIN-no rash    MEDICATIONS  (STANDING):  cefTRIAXone Injectable. 1000 milliGRAM(s) IV Push every 24 hours  heparin   Injectable 5000 Unit(s) SubCutaneous every 12 hours  influenza  Vaccine (HIGH DOSE) 0.5 milliLiter(s) IntraMuscular once  potassium chloride    Tablet ER 40 milliEquivalent(s) Oral every 4 hours    MEDICATIONS  (PRN):  acetaminophen     Tablet .. 650 milliGRAM(s) Oral every 6 hours PRN Temp greater or equal to 38C (100.4F), Mild Pain (1 - 3)  oxyCODONE    IR 5 milliGRAM(s) Oral every 6 hours PRN Moderate Pain (4 - 6)      LABS:                               12.8   11.12 )-----------( 196      ( 2024 07:35 )             39.0       11-26    139  |  110[H]  |  22  ----------------------------<  124[H]  3.2[L]   |  26  |  0.84    Ca    8.9      2024 07:35      < from: CT Head No Cont (24 @ 14:28) >    ACC: [id number] EXAM:  CT BRAIN   ORDERED BY: WILLIAM BERGERON     PROCEDURE DATE:  2024          INTERPRETATION:  CLINICAL INDICATIONS:  fall, RUE essential tremor, r/o   cva    COMPARISON: None.    TECHNIQUE: Noncontrast CT of the head. Multiplanar reformations are   submitted.    FINDINGS: Chronic areas of encephalomalacia and gliosis and bilateral   anterior inferior frontal lobes, bilateral parietal lobes. Moderate   cerebellar volume loss. Chronic left basal ganglia lacunar infarct on   image 17 of series 2. Right frontal calvarial justin hole. Chronic right   parietal craniotomy defect.  There is periventricular and subcortical white matter hypodensity without   mass effect, nonspecific, likely representing mild to moderate chronic   microvascular ischemic changes. There is no compelling evidence for an   acute transcortical infarction. There is no evidence of mass, mass   effect, midline shift or extra-axial fluid collection. The lateral   ventricles and cortical sulci are age-appropriate in size and   configuration.     Near-complete opacification the right maxillary sinus with mucosal   thickening. The orbits, mastoid air cells and visualized paranasal   sinuses are unremarkable. The calvarium is otherwise intact.    Consider MRI as clinically warranted.    IMPRESSION:  Mild to moderate chronic microvascular changes without   evidence of an acute transcortical infarction or hemorrhage. Chronic   changes.    ECHO:  CONCLUSIONS:     1. Left ventricular systolic function is normal with an ejection fraction of 66 % by Roldan's method of disks.   2. Normal right ventricular cavity size.   3. Left atrium is normal in size.   4. The right atrium is normal in size.   5. Structurally normal pulmonic valve with normal leaflet excursion.   6. Structurally normal mitral valve with normal leaflet excursion.   7. Structurally normal tricuspid valve with normal leaflet excursion.   8. Technically difficult image quality.   9. There is mild calcification of the aortic valve leaflets. There is mild thickening of the aortic valve leaflets.      63 y/o M w/ PMH of LLE paralysis 2/2 spinal cord injury in  w/ LLE brace, p/w mechanical fall    *Anterior shoulder dislocation s/p mechanical fall  *Controlled fall on   - neurovascularly in tact  -Fall precautions  -PT eval: Pt high fall risk  -Pain control  -F/u ortho   -Social Work consult     *Bradycardia  - pt asymptomatic  -Cardio consult appreciated  -TSH : normal  - pt to f/u with cardiology in 4 weeks  - no changes to meds  -EK bpm, incomplete RBBB   -Echo WNL, LVEF 66%    # RUE with noted jerking type movements   noted on exam   neurology consult appreciated   CT brain:  Mild to moderate chronic microvascular changes without   evidence of an acute transcortical infarction or hemorrhage. Chronic   changes.  xray cervical spine and RUE    *UTI  -CeftriaxoneD#3  -F/u urine cx     *Advance Directives  -Denies having advance care directives in place     *DVT ppx  switch to lovenox  venodynes      dispo: likely rehab

## 2024-11-26 NOTE — CONSULT NOTE ADULT - SUBJECTIVE AND OBJECTIVE BOX
Neurosurgery :  Radiographic review. Pt with prior spinal fusion s/p fall - pt getting imaging studies in radiology suite -    All data below : (pulled from Hospitalists admission H&P)    HPI:   63 y/o M w/ PMH of LLE paralysis 2/2 spinal cord injury in 1987 w/ LLE brace, p/w mechanical fall. Patient had a trip and fall earlier and came to ED, found to have an anterior shoulder dislocation. Patient was discharged home w/ sling. Patient then had another mechanical fall where he tripped when he went to get prescription and came to ED again. Patient has a LLE brace since 1987 since his previous spinal cord injury, and now with a sling. Patient is having difficutly with ambulation. He lives alone at home     PSH: Spinal surgery     Social Hx: Denies tobacco / drugs, etoh - 2 drinks 2 times per week     Family Hx: Denies pertinent hx    (23 Nov 2024 23:06)    PAST MEDICAL & SURGICAL HISTORY:  Deaf    NKDA    MEDICATIONS  (STANDING):  cefTRIAXone Injectable. 1000 milliGRAM(s) IV Push every 24 hours  enoxaparin Injectable 40 milliGRAM(s) SubCutaneous every 24 hours  influenza  Vaccine (HIGH DOSE) 0.5 milliLiter(s) IntraMuscular once     ROS: Pertinent positives in HPI, all other ROS were reviewed and are negative.     Vital Signs Last 24 Hrs  T(C): 37.1 (26 Nov 2024 07:27), Max: 37.1 (26 Nov 2024 07:27)  T(F): 98.8 (26 Nov 2024 07:27), Max: 98.8 (26 Nov 2024 07:27)  HR: 60 (26 Nov 2024 07:27) (60 - 65)  BP: 152/72 (26 Nov 2024 07:27) (152/72 - 160/60)  BP(mean): 86 (25 Nov 2024 16:05) (86 - 86)  RR: 18 (26 Nov 2024 07:27) (18 - 18)  SpO2: 95% (26 Nov 2024 07:27) (94% - 96%)    Parameters below as of 26 Nov 2024 07:27  Patient On (Oxygen Delivery Method): room air        Labs:                        12.8   11.12 )-----------( 196      ( 26 Nov 2024 07:35 )             39.0     11-26    139  |  110[H]  |  22  ----------------------------<  124[H]  3.2[L]   |  26  |  0.84    Ca    8.9      26 Nov 2024 07:35      11-24 Chol 205[H] LDL -- HDL 32[L] Trig 197[H]    CT C/T/L spine imaging read pending:  Interspinous segmental instrumentation using [place] wires? and rods, appear in place with wiring stabilizing the cervical / Thoracic spine in a patient with DISH / Ankylosing spondylitis / Previous spinal cord injury / surgery 1987    PE: (recently completed by Neurology Team assesment - listed below)  Neurological Examination:  MS: AOx3. Appropriately interactive, normal affect. Conversant, cooperative.   CN: VFFTC, PERLL, EOMI, V1-3 sensation intact, face symmetric, hearing intact, palate elevates symmetrically, tongue midline, SCM equal bilaterally  Motor: Left hemiparesis, LUE in a sling, able to flex some fingers of left hand. LLE prox 1/5, distal 0/5. RUE wasting intrinsic hand muscles, strength ~ 4+/5, RLE 4+/5   Sens: Intact to light touch on right, abnormal on left.    Reflexes: 2-3/4 all over, downgoing toes b/l  Coord:  mild RUE kinetic tremor   Gait: Cannot test      A/P:   63 y/o man ,  PMH of TBI, spinal cord injury, 1987. L hemiparesis, ADM after mechanical fall. Patient tripped and felled, evaluated in Ed, found to have an anterior shoulder dislocation. Patient was discharged home w/ sling. Patient then had another mechanical fall where he tripped when he went to get prescription and came to ED again. Patient has a LLE brace since 1987 since his previous spinal cord injury. Patient has had difficulty with ambulation, uses a cane.    - No acute neurosurgical intervention warranted  - Hardware appears in tact will need to follow up official read of Axial spine CT imaging studies  - Spinal alignment preserved on full axial spina survey (unofficial) will f/u official read  - Pt with DISH / ankylosing spondylitis  - DVT ppx includes SCDs and chemical is recommended also  - OOB with assist after CT spine imaging officially read  - PT eval   - Will follow and continue to monitor ambulation / pain / and if any radicular symptoms ensue after working with PT / increase ambulation  - care plan d/w and determined by attending - Dr. oLpez

## 2024-11-26 NOTE — CONSULT NOTE ADULT - SUBJECTIVE AND OBJECTIVE BOX
Neurology Consult requested by:   Patient is a 64y old  Male who presents with a chief complaint of fall (26 Nov 2024 12:31)     HPI:  63 y/o man RH,  PMH of TBI, spinal cord injury, 1987. L hemiparesis, ADM after mechanical fall. Patient tripped and felled, evaluated in Ed, found to have an anterior shoulder dislocation. Patient was discharged home w/ sling. Patient then had another mechanical fall where he tripped when he went to get prescription and came to ED again. Patient has a LLE brace since 1987 since his previous spinal cord injury, and now with a sling. Patient has had difficulty with ambulation, uses a cane.  No head injury denies headache, dizziness, no neck or back pain, R elbow pain when he attempts to extend elbow.        PSH: Spinal surgery     Social Hx: Denies tobacco / drugs, etoh - 2 drinks 2 times per week     Family Hx: Denies pertinent hx    (23 Nov 2024 23:06)      PAST MEDICAL & SURGICAL HISTORY:  Deaf        FAMILY HISTORY:    Social Hx:  Nonsmoker, no drug or alcohol use  Medications and Allergies ReviewedMEDICATIONS  (STANDING):  cefTRIAXone Injectable. 1000 milliGRAM(s) IV Push every 24 hours  heparin   Injectable 5000 Unit(s) SubCutaneous every 12 hours  influenza  Vaccine (HIGH DOSE) 0.5 milliLiter(s) IntraMuscular once  potassium chloride    Tablet ER 40 milliEquivalent(s) Oral every 4 hours     ROS: Pertinent positives in HPI, all other ROS were reviewed and are negative.      Examination:   Vital Signs Last 24 Hrs  T(C): 37.1 (26 Nov 2024 07:27), Max: 37.1 (26 Nov 2024 07:27)  T(F): 98.8 (26 Nov 2024 07:27), Max: 98.8 (26 Nov 2024 07:27)  HR: 60 (26 Nov 2024 07:27) (60 - 65)  BP: 152/72 (26 Nov 2024 07:27) (152/72 - 160/60)  BP(mean): 86 (25 Nov 2024 16:05) (86 - 86)  RR: 18 (26 Nov 2024 07:27) (18 - 18)  SpO2: 95% (26 Nov 2024 07:27) (94% - 96%)    Parameters below as of 26 Nov 2024 07:27  Patient On (Oxygen Delivery Method): room air      General: Cooperative, NAD   NECK: supple, no masses  ENT: reduced hearing, Bilat hearing aides.   Vascular : no carotid bruits,   Lungs: CTAB  Chest: RRR, no murmurs  Extremities: R elbow mildly tender to palpation, c/o pain on ext of elbow. LUE in sling.   Musculoskeletal: no adventitious movements, no joint stiffness  Skin: no rash    Neurological Examination:  MS: AOx3. Appropriately interactive, normal affect. Conversant, cooperative.   CN: VFFTC, PERLL, EOMI, V1-3 sensation intact, face symmetric, hearing intact, palate elevates symmetrically, tongue midline, SCM equal bilaterally  Motor: Left hemiparesis, LUE in a sling, able to flex some fingers of left hand. LLE prox 1/5, distal 0/5. RUE wasting intrinsic hand muscles, strength ~ 4+/5, RLE 4+/5   Sens: Intact to light touch on right, abnormal on left.    Reflexes: 2-3/4 all over, downgoing toes b/l  Coord:  mild RUE kinetic tremor   Gait: Cannot test    Labs: Reviewed    Basic Metabolic Panel in AM (11.26.24 @ 07:35)   Sodium: 139 mmol/L  Potassium: 3.2 mmol/L  Chloride: 110 mmol/L  Carbon Dioxide: 26 mmol/L  Anion Gap: 3 mmol/L  Blood Urea Nitrogen: 22 mg/dL  Creatinine: 0.84 mg/dL  Glucose: 124 mg/dL  Calcium: 8.9 mg/dL  eGFR: 97:    Complete Blood Count in AM (11.26.24 @ 07:35)   WBC Count: 11.12 K/uL  RBC Count: 3.83 M/uL  Hemoglobin: 12.8 g/dL  Hematocrit: 39.0 %  Mean Cell Volume: 101.8 fl  Mean Cell Hemoglobin: 33.4 pg  Mean Cell Hemoglobin Conc: 32.8 g/dL  Red Cell Distrib Width: 12.2 %  Platelet Count - Automated: 196 K/uL  Thyroid Stimulating Hormone, Serum: 1.12 uU/mL (11.24.24 @ 07:00)   < from: CT Head No Cont (11.25.24 @ 14:28) >  COMPARISON: None.    TECHNIQUE: Noncontrast CT of the head. Multiplanar reformations are   submitted.    FINDINGS: Chronic areas of encephalomalacia and gliosis and bilateral   anterior inferior frontal lobes, bilateral parietal lobes. Moderate   cerebellar volume loss. Chronic left basal ganglia lacunar infarct on   image 17 of series 2. Right frontal calvarial justin hole. Chronic right   parietal craniotomy defect.  There is periventricular and subcortical white matter hypodensity without   mass effect, nonspecific, likely representing mild to moderate chronic   microvascular ischemic changes. There is no compelling evidence for an   acute transcortical infarction. There is no evidence of mass, mass   effect, midline shift or extra-axial fluid collection. The lateral   ventricles and cortical sulci are age-appropriate in size and   configuration.     Near-complete opacification the right maxillary sinus with mucosal   thickening. The orbits, mastoid air cells and visualized paranasal   sinuses are unremarkable. The calvarium is otherwise intact.    Consider MRI as clinically warranted.    IMPRESSION:  Mild to moderate chronic microvascular changes without   evidence of an acute transcortical infarction or hemorrhage. Chronic   changes.    < end of copied text >

## 2024-11-27 LAB
HCT VFR BLD CALC: 38.5 % — LOW (ref 39–50)
HGB BLD-MCNC: 12.7 G/DL — LOW (ref 13–17)
MCHC RBC-ENTMCNC: 33 G/DL — SIGNIFICANT CHANGE UP (ref 32–36)
MCHC RBC-ENTMCNC: 33.5 PG — SIGNIFICANT CHANGE UP (ref 27–34)
MCV RBC AUTO: 101.6 FL — HIGH (ref 80–100)
PLATELET # BLD AUTO: 225 K/UL — SIGNIFICANT CHANGE UP (ref 150–400)
RBC # BLD: 3.79 M/UL — LOW (ref 4.2–5.8)
RBC # FLD: 12.2 % — SIGNIFICANT CHANGE UP (ref 10.3–14.5)
WBC # BLD: 10.85 K/UL — HIGH (ref 3.8–10.5)
WBC # FLD AUTO: 10.85 K/UL — HIGH (ref 3.8–10.5)

## 2024-11-27 PROCEDURE — 99233 SBSQ HOSP IP/OBS HIGH 50: CPT

## 2024-11-27 RX ADMIN — ENOXAPARIN SODIUM 40 MILLIGRAM(S): 30 INJECTION SUBCUTANEOUS at 17:50

## 2024-11-27 NOTE — PROGRESS NOTE ADULT - ASSESSMENT
64 M with Hx of LLE paralysis 2/2 spinal cord injury in 1987 w/ LLE brace, s/p spine surgery with hardware, presented with a mechanical falls complicated by an anterior shoulder dislocation.      Acute Anterior Shoulder Dislocation after a Mechanical Fall  -  s/p closed reduction  -  maintain left arm in sling  -  neurovascular checks  -  fall precautions  -  pain control    S/P Prior Spine Surgery with Hardware  -  appreciate NS and Neurology consults  -  CT imaging of the cervical, thoracic and lumbar spine reviewed  -  no evidence of acute vertebral fractures or acute pathology  -  notes of NS reviewed - no concerns for loosening, disruption or misalignment of hardware    Bradycardia  -  pt asymptomatic  -  Cardio consult appreciated  -  TSH normal  -  pt to f/u with cardiology in 4 weeks  -  Echo WNL, LVEF 66%    RUE with noted jerking type movements  -  appreciate neuro consult  CT brain:  Mild to moderate chronic microvascular changes without evidence of an acute transcortical infarction or hemorrhage. Chronic   changes    UTI  -  s/p Rocephin X 3 days  -  urine CX -> Staph Aureus   -  blood cultures negative    DVT prophylaxis  -  venodynes and Lovenox    Code Status:  Full Code    Dispo:  will likely need rehab   64 M with Hx of LLE paralysis 2/2 spinal cord injury in 1987 w/ LLE brace, s/p spine surgery with hardware, presented with mechanical falls complicated by an anterior left shoulder dislocation.      Acute Left Anterior Shoulder Dislocation after a Mechanical Fall  -  s/p closed reduction  -  maintain left arm in sling  -  neurovascular checks  -  fall precautions  -  pain control  -  fall precautions  -  follow up outpatient with Dr. Kraus    S/P Mechanical Falls  -  no acute injuries other than the left shoulder dislocation  -  appreciate NS and neurology consults  -  CT imaging of the cervical, thoracic and lumbar spine reviewed -> no evidence of acute vertebral fractures or acute pathology  -  notes of NS reviewed - no concerns for loosening, disruption or misalignment of hardware  -  does not appear that falls are related to any loosening or disruption of spine hardware  -  fall precautions  -  PT as tolerated    Bradycardia  -  pt asymptomatic  -  cardio consult appreciated  -  TSH normal  -  pt to f/u with cardiology in 4 weeks  -  ECHO WNL, LVEF 66%    RUE with intermittent jerking type movements  -  appreciate neuro consult  -  CT brain:  Mild to moderate chronic microvascular changes without evidence of an acute transcortical infarction or hemorrhage. Chronic   changes  -  resolved  -  low suspicion for seizures    UTI  -  s/p Rocephin X 3 days  -  urine CX -> Staph Aureus (MSSA)  -  blood cultures negative  -  ECHO negative    DVT prophylaxis  -  venodynes and Lovenox    Code Status:  Full Code    Dispo:  will need rehab   64 M with Hx of LLE paralysis 2/2 spinal cord injury in 1987 w/ LLE brace, s/p spine surgery with hardware, presented with mechanical falls complicated by an anterior left shoulder dislocation.      Acute Left Anterior Shoulder Dislocation after a Mechanical Fall  -  s/p closed reduction  -  maintain left arm in sling  -  neurovascular checks  -  fall precautions  -  pain control  -  fall precautions  -  follow up outpatient with Dr. Kraus    S/P Mechanical Falls  -  no acute injuries other than the left shoulder dislocation  -  appreciate NS and neurology consults  -  CT imaging of the cervical, thoracic and lumbar spine reviewed -> no evidence of acute vertebral fractures or acute pathology  -  notes of NS reviewed - no concerns for loosening, disruption or misalignment of hardware  -  does not appear that falls are related to any loosening or disruption of spine hardware  -  fall precautions  -  PT as tolerated  -  spoke with patient's niece -> will obtain MRI Brain to r/o new CVA    Bradycardia  -  pt asymptomatic  -  cardio consult appreciated  -  TSH normal  -  pt to f/u with cardiology in 4 weeks  -  ECHO WNL, LVEF 66%    RUE with intermittent jerking type movements  -  appreciate neuro consult  -  CT brain:  Mild to moderate chronic microvascular changes without evidence of an acute transcortical infarction or hemorrhage. Chronic   changes  -  resolved  -  low suspicion for seizures    UTI  -  s/p Rocephin X 3 days  -  urine CX -> Staph Aureus (MSSA)  -  blood cultures negative  -  ECHO negative    DVT prophylaxis  -  venodynes and Lovenox    Code Status:  Full Code    Dispo:  will need rehab    d/w patient's niece Yolande

## 2024-11-27 NOTE — PROGRESS NOTE ADULT - SUBJECTIVE AND OBJECTIVE BOX
Neurosurgery folllow up:    All radiographic imaging reviewed.  Pt's hardware of rods and wiring with alignment all intact  No disruption of hardware  DISH noted along with Ankylosing spondylitis elements to spine imaging CT study  d/w and all film reports provided to Dr. Lopez  Continue with DVT ppx / PT / OT safety goals with ambulation while recovering from shoulder dislocation

## 2024-11-27 NOTE — PROGRESS NOTE ADULT - SUBJECTIVE AND OBJECTIVE BOX
Chart and meds reviewed.  Patient seen and examined.    11/27 -    All other systems reviewed and found to be negative with the exception of what has been described above.    MEDICATIONS  (STANDING):  enoxaparin Injectable 40 milliGRAM(s) SubCutaneous every 24 hours  influenza  Vaccine (HIGH DOSE) 0.5 milliLiter(s) IntraMuscular once    MEDICATIONS  (PRN):  acetaminophen     Tablet .. 650 milliGRAM(s) Oral every 6 hours PRN Temp greater or equal to 38C (100.4F), Mild Pain (1 - 3)  oxyCODONE    IR 5 milliGRAM(s) Oral every 6 hours PRN Moderate Pain (4 - 6)    VITAL SIGNS:  T(F): 97.7 (11-27-24 @ 07:42), Max: 98.1 (11-27-24 @ 00:14)  HR: 59 (11-27-24 @ 07:42) (59 - 63)  BP: 144/47 (11-27-24 @ 07:42) (126/63 - 163/73)  RR: 18 (11-27-24 @ 07:42) (18 - 18)  SpO2: 93% (11-27-24 @ 07:42) (93% - 94%)    I&O's Summary    26 Nov 2024 07:01  -  27 Nov 2024 07:00  --------------------------------------------------------  IN: 0 mL / OUT: 500 mL / NET: -500 mL    PHYSICAL EXAM:  HEENT:  pupils equal and reactive, EOMI, no oropharyngeal lesions, erythema, exudates, oral thrush  NECK:   supple, no carotid bruits  CV:  +S1, +S2, regular, no murmurs  RESP:   lungs clear to auscultation bilaterally, no wheezing, rales, rhonchi, good air entry bilaterally  GI:  abdomen soft, non-tender, non-distended, normal BS  EXT:  no clubbing, no cyanosis, no edema, no calf pain, swelling or erythema  NEURO:  AAOX3, no focal neurological deficits, follows all commands, able to move extremities spontaneously  SKIN:  no ulcers, lesions or rashes    LABS:                        12.7   10.85 )-----------( 225      ( 27 Nov 2024 07:20 )             38.5     11-26    139  |  110[H]  |  22  ----------------------------<  124[H]  3.2[L]   |  26  |  0.84    Ca    8.9      26 Nov 2024 07:35     Chart and meds reviewed.  Patient seen and examined.    11/27 -  patient has no complaints at this time, no further hand tremors noted, reports no back pain, and reports no movement of his left leg due to prior spinal injury.      All other systems reviewed and found to be negative with the exception of what has been described above.    MEDICATIONS  (STANDING):  enoxaparin Injectable 40 milliGRAM(s) SubCutaneous every 24 hours  influenza  Vaccine (HIGH DOSE) 0.5 milliLiter(s) IntraMuscular once    MEDICATIONS  (PRN):  acetaminophen     Tablet .. 650 milliGRAM(s) Oral every 6 hours PRN Temp greater or equal to 38C (100.4F), Mild Pain (1 - 3)  oxyCODONE    IR 5 milliGRAM(s) Oral every 6 hours PRN Moderate Pain (4 - 6)    VITAL SIGNS:  T(F): 97.7 (11-27-24 @ 07:42), Max: 98.1 (11-27-24 @ 00:14)  HR: 59 (11-27-24 @ 07:42) (59 - 63)  BP: 144/47 (11-27-24 @ 07:42) (126/63 - 163/73)  RR: 18 (11-27-24 @ 07:42) (18 - 18)  SpO2: 93% (11-27-24 @ 07:42) (93% - 94%)    I&O's Summary    26 Nov 2024 07:01  -  27 Nov 2024 07:00  --------------------------------------------------------  IN: 0 mL / OUT: 500 mL / NET: -500 mL    PHYSICAL EXAM:  HEENT:  pupils equal and reactive, EOMI, no oropharyngeal lesions, erythema, exudates, oral thrush  NECK:   supple, no carotid bruits  CV:  +S1, +S2, regular, no murmurs  RESP:   lungs clear to auscultation bilaterally, no wheezing, rales, rhonchi, good air entry bilaterally  GI:  abdomen soft, non-tender, non-distended, normal BS  EXT:  no LE edema, left arm in sling  NEURO:  AAOX3, left arm in sling, decreased ROM of the left arm, left leg paralysis moves the RUE/RLE  SKIN:  no ulcers, lesions or rashes    LABS:                        12.7   10.85 )-----------( 225      ( 27 Nov 2024 07:20 )             38.5     11-26    139  |  110[H]  |  22  ----------------------------<  124[H]  3.2[L]   |  26  |  0.84    Ca    8.9      26 Nov 2024 07:35

## 2024-11-28 LAB
ANION GAP SERPL CALC-SCNC: 3 MMOL/L — LOW (ref 5–17)
BUN SERPL-MCNC: 27 MG/DL — HIGH (ref 7–23)
CALCIUM SERPL-MCNC: 9.2 MG/DL — SIGNIFICANT CHANGE UP (ref 8.5–10.1)
CHLORIDE SERPL-SCNC: 110 MMOL/L — HIGH (ref 96–108)
CO2 SERPL-SCNC: 27 MMOL/L — SIGNIFICANT CHANGE UP (ref 22–31)
CREAT SERPL-MCNC: 0.8 MG/DL — SIGNIFICANT CHANGE UP (ref 0.5–1.3)
EGFR: 98 ML/MIN/1.73M2 — SIGNIFICANT CHANGE UP
GLUCOSE SERPL-MCNC: 141 MG/DL — HIGH (ref 70–99)
HCT VFR BLD CALC: 40.3 % — SIGNIFICANT CHANGE UP (ref 39–50)
HGB BLD-MCNC: 13.1 G/DL — SIGNIFICANT CHANGE UP (ref 13–17)
MCHC RBC-ENTMCNC: 32.5 G/DL — SIGNIFICANT CHANGE UP (ref 32–36)
MCHC RBC-ENTMCNC: 33.7 PG — SIGNIFICANT CHANGE UP (ref 27–34)
MCV RBC AUTO: 103.6 FL — HIGH (ref 80–100)
PLATELET # BLD AUTO: 247 K/UL — SIGNIFICANT CHANGE UP (ref 150–400)
POTASSIUM SERPL-MCNC: 3.8 MMOL/L — SIGNIFICANT CHANGE UP (ref 3.5–5.3)
POTASSIUM SERPL-SCNC: 3.8 MMOL/L — SIGNIFICANT CHANGE UP (ref 3.5–5.3)
RBC # BLD: 3.89 M/UL — LOW (ref 4.2–5.8)
RBC # FLD: 12.2 % — SIGNIFICANT CHANGE UP (ref 10.3–14.5)
SODIUM SERPL-SCNC: 140 MMOL/L — SIGNIFICANT CHANGE UP (ref 135–145)
WBC # BLD: 10.38 K/UL — SIGNIFICANT CHANGE UP (ref 3.8–10.5)
WBC # FLD AUTO: 10.38 K/UL — SIGNIFICANT CHANGE UP (ref 3.8–10.5)

## 2024-11-28 PROCEDURE — 70551 MRI BRAIN STEM W/O DYE: CPT | Mod: 26

## 2024-11-28 PROCEDURE — 99232 SBSQ HOSP IP/OBS MODERATE 35: CPT

## 2024-11-28 RX ADMIN — OXYCODONE HYDROCHLORIDE 5 MILLIGRAM(S): 30 TABLET ORAL at 00:36

## 2024-11-28 RX ADMIN — OXYCODONE HYDROCHLORIDE 5 MILLIGRAM(S): 30 TABLET ORAL at 21:13

## 2024-11-28 RX ADMIN — OXYCODONE HYDROCHLORIDE 5 MILLIGRAM(S): 30 TABLET ORAL at 01:06

## 2024-11-28 RX ADMIN — ENOXAPARIN SODIUM 40 MILLIGRAM(S): 30 INJECTION SUBCUTANEOUS at 16:36

## 2024-11-28 RX ADMIN — OXYCODONE HYDROCHLORIDE 5 MILLIGRAM(S): 30 TABLET ORAL at 20:43

## 2024-11-28 NOTE — PROGRESS NOTE ADULT - SUBJECTIVE AND OBJECTIVE BOX
Chart and meds reviewed.  Patient seen and examined.    11/28 - no events overnight.  No complaints this morning.  Waiting to go for MRI of the brain.    All other systems reviewed and found to be negative with the exception of what has been described above.    MEDICATIONS  (STANDING):  enoxaparin Injectable 40 milliGRAM(s) SubCutaneous every 24 hours  influenza  Vaccine (HIGH DOSE) 0.5 milliLiter(s) IntraMuscular once    MEDICATIONS  (PRN):  acetaminophen     Tablet .. 650 milliGRAM(s) Oral every 6 hours PRN Temp greater or equal to 38C (100.4F), Mild Pain (1 - 3)  oxyCODONE    IR 5 milliGRAM(s) Oral every 6 hours PRN Moderate Pain (4 - 6)    VITAL SIGNS:  T(F): 97.5 (11-28-24 @ 07:56), Max: 99 (11-28-24 @ 00:19)  HR: 59 (11-28-24 @ 07:56) (59 - 65)  BP: 145/67 (11-28-24 @ 07:56) (139/61 - 156/63)  RR: 18 (11-28-24 @ 07:56) (18 - 18)  SpO2: 95% (11-28-24 @ 07:56) (95% - 97%)    I&O's Summary    27 Nov 2024 07:01  -  28 Nov 2024 07:00  --------------------------------------------------------  IN: 0 mL / OUT: 200 mL / NET: -200 mL    PHYSICAL EXAM:  HEENT:  pupils equal and reactive, EOMI, no oropharyngeal lesions, erythema, exudates, oral thrush  NECK:   supple, no carotid bruits  CV:  +S1, +S2, regular, no murmurs  RESP:   lungs clear to auscultation bilaterally, no wheezing, rales, rhonchi, good air entry bilaterally  GI:  abdomen soft, non-tender, non-distended, normal BS  EXT:  left arm in sling, unable to move his left leg  NEURO:  AAOX3, no focal neurological deficits except for left leg paralysis, follows simple commands  SKIN:  no ulcers, lesions or rashes    LABS:  Labs today pending

## 2024-11-28 NOTE — PROGRESS NOTE ADULT - ASSESSMENT
64 M with Hx of LLE paralysis 2/2 spinal cord injury in 1987 w/ LLE brace, s/p spine surgery with hardware, presented with mechanical falls complicated by an anterior left shoulder dislocation.      Acute Left Anterior Shoulder Dislocation after a Mechanical Fall  -  s/p closed reduction  -  maintain left arm in sling  -  neurovascular checks  -  fall precautions  -  pain control  -  fall precautions  -  follow up outpatient with Dr. Kraus    S/P Mechanical Falls  -  no acute injuries other than the left shoulder dislocation  -  appreciate NS and neurology consults  -  CT imaging of the cervical, thoracic and lumbar spine reviewed -> no evidence of acute vertebral fractures or acute pathology  -  notes of NS reviewed - no concerns for loosening, disruption or misalignment of hardware  -  does not appear that falls are related to any loosening or disruption of spine hardware  -  fall precautions  -  PT as tolerated  -  spoke with patient's niece -> await MRI of the brain to be done    Bradycardia  -  pt asymptomatic  -  cardio consult appreciated  -  TSH normal  -  pt to f/u with cardiology in 4 weeks  -  ECHO WNL, LVEF 66%    RUE with intermittent jerking type movements  -  appreciate neuro consult  -  CT brain:  Mild to moderate chronic microvascular changes without evidence of an acute transcortical infarction or hemorrhage. Chronic   changes  -  resolved  -  low suspicion for seizures    UTI  -  s/p IV Rocephin X 3 days  -  urine CX -> Staph Aureus (MSSA)  -  blood cultures negative  -  ECHO negative    DVT prophylaxis  -  venodynes and Lovenox    Code Status:  Full Code    Dispo:  will need rehab, CM sent referrals, waiting to hear back as will need acceptance and authorization

## 2024-11-29 LAB
CULTURE RESULTS: SIGNIFICANT CHANGE UP
CULTURE RESULTS: SIGNIFICANT CHANGE UP
SPECIMEN SOURCE: SIGNIFICANT CHANGE UP
SPECIMEN SOURCE: SIGNIFICANT CHANGE UP

## 2024-11-29 PROCEDURE — 99232 SBSQ HOSP IP/OBS MODERATE 35: CPT

## 2024-11-29 RX ADMIN — OXYCODONE HYDROCHLORIDE 5 MILLIGRAM(S): 30 TABLET ORAL at 22:34

## 2024-11-29 RX ADMIN — OXYCODONE HYDROCHLORIDE 5 MILLIGRAM(S): 30 TABLET ORAL at 05:52

## 2024-11-29 RX ADMIN — OXYCODONE HYDROCHLORIDE 5 MILLIGRAM(S): 30 TABLET ORAL at 23:04

## 2024-11-29 RX ADMIN — OXYCODONE HYDROCHLORIDE 5 MILLIGRAM(S): 30 TABLET ORAL at 05:22

## 2024-11-29 RX ADMIN — ENOXAPARIN SODIUM 40 MILLIGRAM(S): 30 INJECTION SUBCUTANEOUS at 17:03

## 2024-11-29 NOTE — PROGRESS NOTE ADULT - SUBJECTIVE AND OBJECTIVE BOX
Chart and meds reviewed.  Patient seen and examined.    11/29 - no events overnight, went for MRI brain yesterday, no complaints today    All other systems reviewed and found to be negative with the exception of what has been described above.    MEDICATIONS  (STANDING):  enoxaparin Injectable 40 milliGRAM(s) SubCutaneous every 24 hours  influenza  Vaccine (HIGH DOSE) 0.5 milliLiter(s) IntraMuscular once    MEDICATIONS  (PRN):  acetaminophen     Tablet .. 650 milliGRAM(s) Oral every 6 hours PRN Temp greater or equal to 38C (100.4F), Mild Pain (1 - 3)  oxyCODONE    IR 5 milliGRAM(s) Oral every 6 hours PRN Moderate Pain (4 - 6)    VITAL SIGNS:  T(F): 98 (11-29-24 @ 00:39), Max: 98.2 (11-28-24 @ 16:00)  HR: 57 (11-29-24 @ 00:39) (57 - 60)  BP: 142/48 (11-29-24 @ 00:39) (142/48 - 150/64)  RR: 17 (11-29-24 @ 00:39) (17 - 18)  SpO2: 94% (11-29-24 @ 00:39) (94% - 94%)    I&O's Summary    28 Nov 2024 07:01  -  29 Nov 2024 07:00  --------------------------------------------------------  IN: 240 mL / OUT: 700 mL / NET: -460 mL    PHYSICAL EXAM:  HEENT:  pupils equal and reactive, EOMI, no oropharyngeal lesions, erythema, exudates, oral thrush  NECK:   supple, no carotid bruits  CV:  +S1, +S2, regular, murmurs  RESP:   lungs clear to auscultation bilaterally, no wheezing, rales, rhonchi, good air entry bilaterally  GI:  abdomen soft, non-tender, non-distended, normal BS  EXT:  left arm in sling, left leg paralysis  NEURO:  AAOX3, no new focal neurological deficits, follows all commands  SKIN:  no ulcers, lesions or rashes    LABS:                        13.1   10.38 )-----------( 247      ( 28 Nov 2024 08:02 )             40.3     11-28    140  |  110[H]  |  27[H]  ----------------------------<  141[H]  3.8   |  27  |  0.80    Ca    9.2      28 Nov 2024 08:02

## 2024-11-29 NOTE — PROGRESS NOTE ADULT - ASSESSMENT
64 M with Hx of LLE paralysis 2/2 spinal cord injury in 1987 w/ LLE brace, s/p spine surgery with hardware, presented with mechanical falls complicated by an anterior left shoulder dislocation.      Acute Left Anterior Shoulder Dislocation after a Mechanical Fall  -  s/p closed reduction  -  maintain left arm in sling  -  neurovascular checks  -  fall precautions  -  pain control  -  fall precautions  -  follow up outpatient with Dr. Kraus    S/P Mechanical Falls  -  no acute injuries other than the left shoulder dislocation  -  appreciate NS and neurology consults  -  CT imaging of the cervical, thoracic and lumbar spine reviewed -> no evidence of acute vertebral fractures or acute pathology  -  notes of NS reviewed - no concerns for loosening, disruption or misalignment of hardware  -  does not appear that falls are related to any loosening or disruption of spine hardware  -  fall precautions  -  PT as tolerated  -  MRI brain reviewed:  extensive bilateral gliosis and encephalomalacia, no bleed, mass effect, fluid collection or infarct    Bradycardia  -  pt asymptomatic  -  cardio consult appreciated  -  TSH normal  -  pt to f/u with cardiology in 4 weeks  -  ECHO WNL, LVEF 66%    RUE with intermittent jerking type movements  -  appreciate neuro consult  -  CT brain:  Mild to moderate chronic microvascular changes without evidence of an acute transcortical infarction or hemorrhage. Chronic changes  -  resolved  -  low suspicion for seizures    UTI  -  s/p IV Rocephin X 3 days  -  urine CX -> Staph Aureus (MSSA)  -  blood cultures negative  -  ECHO negative    DVT prophylaxis  -  venodynes and Lovenox    Code Status:  Full Code    Dispo:  will need rehab, CM sent referrals, waiting to hear back as will need acceptance and authorization

## 2024-11-30 PROCEDURE — 99232 SBSQ HOSP IP/OBS MODERATE 35: CPT

## 2024-11-30 RX ADMIN — OXYCODONE HYDROCHLORIDE 5 MILLIGRAM(S): 30 TABLET ORAL at 08:56

## 2024-11-30 RX ADMIN — ENOXAPARIN SODIUM 40 MILLIGRAM(S): 30 INJECTION SUBCUTANEOUS at 18:37

## 2024-11-30 RX ADMIN — OXYCODONE HYDROCHLORIDE 5 MILLIGRAM(S): 30 TABLET ORAL at 08:26

## 2024-11-30 NOTE — PROGRESS NOTE ADULT - ASSESSMENT
64 M with Hx of LLE paralysis 2/2 spinal cord injury in 1987 w/ LLE brace, s/p spine surgery with hardware, presented with mechanical falls complicated by an anterior left shoulder dislocation.      Acute Left Anterior Shoulder Dislocation after a Mechanical Fall  -  s/p closed reduction  -  maintain left arm in sling  -  neurovascular checks  -  fall precautions  -  pain control  -  fall precautions  -  follow up outpatient with Dr. Kraus    S/P Mechanical Falls  -  no acute injuries other than the left shoulder dislocation  -  appreciate NS and neurology consults  -  CT imaging of the cervical, thoracic and lumbar spine reviewed -> no evidence of acute vertebral fractures or acute pathology  -  notes of NS reviewed - no concerns for loosening, disruption or misalignment of hardware  -  does not appear that falls are related to any loosening or disruption of spine hardware  -  fall precautions  -  PT as tolerated  -  MRI brain reviewed:  extensive bilateral gliosis and encephalomalacia, no bleed, mass effect, fluid collection or infarct    Bradycardia  -  pt asymptomatic  -  cardio consult appreciated  -  TSH normal  -  pt to f/u with cardiology in 4 weeks  -  ECHO WNL, LVEF 66%    RUE with intermittent jerking type movements  -  appreciate neuro consult  -  CT brain:  Mild to moderate chronic microvascular changes without evidence of an acute transcortical infarction or hemorrhage. Chronic changes  -  resolved  -  low suspicion for seizures    UTI  -  s/p IV Rocephin X 3 days  -  urine CX -> Staph Aureus (MSSA)  -  blood cultures negative  -  ECHO negative    DVT prophylaxis  -  venodynes and Lovenox    Code Status:  Full Code    Dispo:  will need rehab short-term rehab, does not qualify for acute rehab, CM sent referrals, waiting to hear back as will need acceptance and authorization   64 M with Hx of LLE paralysis 2/2 spinal cord injury in 1987 w/ LLE brace, s/p spine surgery with hardware, presented with mechanical falls complicated by an anterior left shoulder dislocation.      Acute Left Anterior Shoulder Dislocation X 2 after a Mechanical Falls  -  last dislocation occurred on 11/24  -  seen by ortho at that time and there was concern for brachial plexus injury with new onset of lack of elbow flexion and wrist extension  -  may take prolonged period of time for nerve function to recover  -  s/p closed reduction on 11/24  -  recs per ortho:  NWB LUE, sling and swath, shoulder immobilizer was ordered  -  will need nerve conduction studies in 6 weeks  -  maintain left arm in sling  -  neurovascular checks  -  fall precautions  -  pain control  -  follow up outpatient with Dr. Kraus    S/P Mechanical Falls  -  no acute injuries other than the left shoulder dislocation  -  appreciate NS and neurology consults  -  CT imaging of the cervical, thoracic and lumbar spine reviewed -> no evidence of acute vertebral fractures or acute pathology  -  notes of NS reviewed - no concerns for loosening, disruption or misalignment of hardware  -  does not appear that falls are related to any loosening or disruption of spine hardware  -  fall precautions  -  PT as tolerated  -  MRI brain reviewed:  extensive bilateral gliosis and encephalomalacia, no bleed, mass effect, fluid collection or infarct    Bradycardia  -  pt asymptomatic  -  cardio consult appreciated  -  TSH normal  -  pt to f/u with cardiology in 4 weeks  -  ECHO WNL, LVEF 66%    RUE with intermittent jerking type movements  -  appreciate neuro consult  -  CT brain:  Mild to moderate chronic microvascular changes without evidence of an acute transcortical infarction or hemorrhage. Chronic changes  -  resolved  -  low suspicion for seizures    UTI  -  s/p IV Rocephin X 3 days  -  urine CX -> Staph Aureus (MSSA)  -  blood cultures negative  -  ECHO negative    DVT prophylaxis  -  venodynes and Lovenox    Code Status:  Full Code    Dispo:  will need rehab short-term rehab, does not qualify for acute rehab, CM sent referrals, waiting to hear back as will need acceptance and authorization

## 2024-11-30 NOTE — PROGRESS NOTE ADULT - SUBJECTIVE AND OBJECTIVE BOX
Chart and meds reviewed.  Patient seen and examined.    11/30 - no events overnight, awaiting placement to SNF, no complaints    All other systems reviewed and found to be negative with the exception of what has been described above.    MEDICATIONS  (STANDING):  enoxaparin Injectable 40 milliGRAM(s) SubCutaneous every 24 hours  influenza  Vaccine (HIGH DOSE) 0.5 milliLiter(s) IntraMuscular once    MEDICATIONS  (PRN):  acetaminophen     Tablet .. 650 milliGRAM(s) Oral every 6 hours PRN Temp greater or equal to 38C (100.4F), Mild Pain (1 - 3)  oxyCODONE    IR 5 milliGRAM(s) Oral every 6 hours PRN Moderate Pain (4 - 6)    VITAL SIGNS:  T(F): 98 (11-30-24 @ 07:35), Max: 98.7 (11-30-24 @ 00:00)  HR: 59 (11-30-24 @ 07:35) (55 - 60)  BP: 153/58 (11-30-24 @ 07:35) (126/49 - 155/63)  RR: 17 (11-30-24 @ 07:35) (17 - 17)  SpO2: 99% (11-30-24 @ 07:35) (94% - 99%)    I&O's Summary    29 Nov 2024 07:01  -  30 Nov 2024 07:00  --------------------------------------------------------  IN: 0 mL / OUT: 1100 mL / NET: -1100 mL    PHYSICAL EXAM:  HEENT:  pupils equal and reactive, EOMI, no oropharyngeal lesions, erythema, exudates, oral thrush  NECK:   supple, no carotid bruits  CV:  +S1, +S2, regular, no murmurs  RESP:   lungs clear to auscultation bilaterally, no wheezing, rales, rhonchi, good air entry bilaterally  GI:  abdomen soft, non-tender, non-distended, normal BS  EXT:  left arm in sling, unable to move left leg  NEURO:  AAOX3, left leg paralysis, follows commands, moves other extrremities  SKIN:  no ulcers, lesions or rashes    LABS:  No new labs

## 2024-12-01 PROCEDURE — 99232 SBSQ HOSP IP/OBS MODERATE 35: CPT

## 2024-12-01 RX ADMIN — OXYCODONE HYDROCHLORIDE 5 MILLIGRAM(S): 30 TABLET ORAL at 21:55

## 2024-12-01 RX ADMIN — ENOXAPARIN SODIUM 40 MILLIGRAM(S): 30 INJECTION SUBCUTANEOUS at 17:25

## 2024-12-01 RX ADMIN — ACETAMINOPHEN 500MG 650 MILLIGRAM(S): 500 TABLET, COATED ORAL at 06:00

## 2024-12-01 RX ADMIN — OXYCODONE HYDROCHLORIDE 5 MILLIGRAM(S): 30 TABLET ORAL at 21:25

## 2024-12-01 RX ADMIN — ACETAMINOPHEN 500MG 650 MILLIGRAM(S): 500 TABLET, COATED ORAL at 06:30

## 2024-12-01 NOTE — PROGRESS NOTE ADULT - ASSESSMENT
64 M with Hx of LLE paralysis 2/2 spinal cord injury in 1987 w/ LLE brace, s/p spine surgery with hardware, presented with mechanical falls complicated by an anterior left shoulder dislocation.      Acute Left Anterior Shoulder Dislocation X 2 after a Mechanical Falls  -  last dislocation occurred on 11/24  -  seen by ortho at that time and there was concern for brachial plexus injury with new onset of lack of elbow flexion and wrist extension  -  may take prolonged period of time for nerve function to recover  -  s/p closed reduction on 11/24  -  recs per ortho:  NWB LUE, sling and swath, shoulder immobilizer was ordered  -  will need nerve conduction studies in 6 weeks  -  maintain left arm in sling  -  neurovascular checks  -  fall precautions  -  pain control  -  follow up outpatient with Dr. Kraus    S/P Mechanical Falls  -  no acute injuries other than the left shoulder dislocation  -  appreciate NS and neurology consults  -  CT imaging of the cervical, thoracic and lumbar spine reviewed -> no evidence of acute vertebral fractures or acute pathology  -  notes of NS reviewed - no concerns for loosening, disruption or misalignment of hardware  -  does not appear that falls are related to any loosening or disruption of spine hardware  -  fall precautions  -  PT as tolerated  -  MRI brain reviewed:  extensive bilateral gliosis and encephalomalacia, no bleed, mass effect, fluid collection or infarct    Bradycardia  -  pt asymptomatic  -  cardio consult appreciated  -  TSH normal  -  pt to f/u with cardiology in 4 weeks  -  ECHO WNL, LVEF 66%    RUE with intermittent jerking type movements  -  appreciate neuro consult  -  CT brain:  Mild to moderate chronic microvascular changes without evidence of an acute transcortical infarction or hemorrhage. Chronic changes  -  resolved  -  low suspicion for seizures    UTI  -  s/p IV Rocephin X 3 days  -  urine CX -> Staph Aureus (MSSA)  -  blood cultures negative  -  ECHO negative    DVT prophylaxis  -  venodynes and Lovenox    Code Status:  Full Code    Dispo:  will need rehab short-term rehab, does not qualify for acute rehab, CM sent referrals, waiting to hear back as will need acceptance and authorization

## 2024-12-01 NOTE — PROGRESS NOTE ADULT - SUBJECTIVE AND OBJECTIVE BOX
Chart and meds reviewed.  Patient seen and examined.    12/1 - no events overnight, no complaints today    All other systems reviewed and found to be negative with the exception of what has been described above.    MEDICATIONS  (STANDING):  enoxaparin Injectable 40 milliGRAM(s) SubCutaneous every 24 hours  influenza  Vaccine (HIGH DOSE) 0.5 milliLiter(s) IntraMuscular once    MEDICATIONS  (PRN):  acetaminophen     Tablet .. 650 milliGRAM(s) Oral every 6 hours PRN Temp greater or equal to 38C (100.4F), Mild Pain (1 - 3)  oxyCODONE    IR 5 milliGRAM(s) Oral every 6 hours PRN Moderate Pain (4 - 6)    VITAL SIGNS:  T(F): 97.2 (12-01-24 @ 07:17), Max: 97.6 (11-30-24 @ 16:00)  HR: 56 (12-01-24 @ 07:17) (56 - 56)  BP: 138/64 (12-01-24 @ 07:17) (138/64 - 158/65)  RR: 18 (12-01-24 @ 07:17) (18 - 18)  SpO2: 96% (12-01-24 @ 07:17) (96% - 96%)  Wt(kg): --    I&O's Summary    30 Nov 2024 07:01  -  01 Dec 2024 07:00  --------------------------------------------------------  IN: 240 mL / OUT: 850 mL / NET: -610 mL    PHYSICAL EXAM:  HEENT:  pupils equal and reactive, EOMI, no oropharyngeal lesions, erythema, exudates, oral thrush  NECK:   supple, no carotid bruits  CV:  +S1, +S2, regular, no murmurs  RESP:   lungs clear to auscultation bilaterally, no wheezing, rales, rhonchi, good air entry bilaterally  GI:  abdomen soft, non-tender, non-distended, normal BS  EXT:  left arm in sling, cannot move his left leg  NEURO:  AAOX3, left leg paralysis, follows all commands  SKIN:  no ulcers, lesions or rashes    LABS:  No new labs

## 2024-12-01 NOTE — PROGRESS NOTE ADULT - PROVIDER SPECIALTY LIST ADULT
Hospitalist
Neurosurgery
Hospitalist

## 2024-12-02 ENCOUNTER — TRANSCRIPTION ENCOUNTER (OUTPATIENT)
Age: 64
End: 2024-12-02

## 2024-12-02 VITALS
OXYGEN SATURATION: 97 % | SYSTOLIC BLOOD PRESSURE: 119 MMHG | TEMPERATURE: 98 F | HEART RATE: 56 BPM | RESPIRATION RATE: 18 BRPM | DIASTOLIC BLOOD PRESSURE: 49 MMHG

## 2024-12-02 PROCEDURE — 99239 HOSP IP/OBS DSCHRG MGMT >30: CPT

## 2024-12-02 RX ORDER — ENOXAPARIN SODIUM 30 MG/.3ML
40 INJECTION SUBCUTANEOUS
Qty: 0 | Refills: 0 | DISCHARGE

## 2024-12-02 RX ORDER — OXYCODONE HYDROCHLORIDE 30 MG/1
1 TABLET ORAL
Qty: 0 | Refills: 0 | DISCHARGE

## 2024-12-02 RX ORDER — ACETAMINOPHEN 500MG 500 MG/1
2 TABLET, COATED ORAL
Qty: 0 | Refills: 0 | DISCHARGE
Start: 2024-12-02

## 2024-12-02 RX ADMIN — INFLUENZA VIRUS VACCINE 0.5 MILLILITER(S): 15; 15; 15; 15 SUSPENSION INTRAMUSCULAR at 13:34

## 2024-12-02 NOTE — DISCHARGE NOTE PROVIDER - NSDCCPCAREPLAN_GEN_ALL_CORE_FT
PRINCIPAL DISCHARGE DIAGNOSIS  Diagnosis: Dislocation, shoulder closed, left, initial encounter  Assessment and Plan of Treatment: keep sling on at all times  tylenol or oxycodone for pain  rehab for physical therapy  follow up with Dr. Kraus in 6 weeks for nerve assessments      SECONDARY DISCHARGE DIAGNOSES  Diagnosis: Acute UTI  Assessment and Plan of Treatment: completed Rocephin antibiotics  continue to drink plenty of fluids  inform doctor if any burning, frequency or difficultu on urination    Diagnosis: Multiple falls  Assessment and Plan of Treatment:

## 2024-12-02 NOTE — DISCHARGE NOTE PROVIDER - ATTENDING DISCHARGE PHYSICAL EXAMINATION:
HEENT:  pupils equal and reactive, EOMI, no oropharyngeal lesions, erythema, exudates, oral thrush  NECK:   supple, no carotid bruits  CV:  +S1, +S2, regular, no murmurs  RESP:   lungs clear to auscultation bilaterally, no wheezing, rales, rhonchi, good air entry bilaterally  GI:  abdomen soft, non-tender, non-distended, normal BS  EXT:  left arm in sling, cannot move his left leg  NEURO:  AAOX3, left leg paralysis, follows all commands  SKIN:  no ulcers, lesions or rashes

## 2024-12-02 NOTE — DISCHARGE NOTE NURSING/CASE MANAGEMENT/SOCIAL WORK - PATIENT PORTAL LINK FT
You can access the FollowMyHealth Patient Portal offered by [facility] by registering at the following website: [email or web address]. By joining [employer]’s FollowMyHealth portal, you will also be able to view your health information using other applications (apps) compatible with our system.

## 2024-12-02 NOTE — DISCHARGE NOTE NURSING/CASE MANAGEMENT/SOCIAL WORK - FINANCIAL ASSISTANCE
[facility] provides services at a reduced cost to those who are determined to be eligible through [facility]’s financial assistance program. Information regarding [facility]’s financial assistance program can be found by going to [email or web address] or by calling [phone].

## 2024-12-02 NOTE — DISCHARGE NOTE PROVIDER - NSDCFUSCHEDAPPT_GEN_ALL_CORE_FT
[facility]  [place]  [facility]  Scheduled Appointment: 12/10/2024    [facility]  MRI [address]  Scheduled Appointment: 12/20/2024    [facility]  CATSCAN [address]  Scheduled Appointment: 12/20/2024

## 2024-12-02 NOTE — CHART NOTE - NSCHARTNOTEFT_GEN_A_CORE
Deven was awake and alert during evaluation, measurements, and fitting of a left shoulder immobilizer sling size large with two full length arm sleeves to promote good hygiene. Fit was good. Showed patient how to don and doff Sling. Provided written and verbal instructions. [facility] [phone]
*63 y/o M w/ PMH of LLE paralysis 2/2 spinal cord injury in 1987 w/ LLE brace, p/w mechanical fall. Patient had a trip and fall earlier and came to ED, found to have an anterior shoulder dislocation. Patient was discharged home w/ sling. Patient then had another mechanical fall where he tripped when he went to get prescription and came to ED again. Patient has a LLE brace since 1987 since his previous spinal cord injury, and now with a sling. Patient is having difficulty with ambulation.    *RD visited pt at bed side this morning for LOS visit. Pt very [place] and not wearing hearing aides, RD had to communicate by writing info down and pt read off RD questions. Reports no changes to appetite since admission. Currently on DASH/TLC diet. Consumed 100% breakfast tray this morning. Denies recent wt changes. Was unable to obtain bed scale wt 2/2 scale not working. EMR wt doc'd at 204.6#. Denies N/V, diarrhea, constipation. No muscle/fat wasting noted on NFPE at this time. Pending rehab placement, plan for d/c today. RD will continue to monitor PO intake, labs, hydration, and wt prn.
spoke with nIece Yolande:  [phone], her concern is that pt fractured his hardware, from a previous spine surgery, when he fell.  Pt is asymptomatic presently  neurosurgery consulted  CT scan of cervical, thoracic. lumbar.

## 2024-12-02 NOTE — DISCHARGE NOTE PROVIDER - HOSPITAL COURSE
63 y/o M w/ PMH of  TBI, spinal cord injury, 1987. L hemiparesis, ADM after mechanical fall. Patient tripped and fell, evaluated in Ed, found to have an anterior shoulder dislocation. Patient was discharged home w/ sling. Patient then had another mechanical fall at the pharmacy when he went to get his prescriptions  and came to ED again. Patient has a LLE brace since 1987 since his previous spinal cord injury, and now with a sling. Patient has had difficulty with ambulation, uses a cane and ha sa brace for his LLE.  No head injury denies headache, dizziness, no neck or back pain, R elbow pain when he attempts to extend elbow. LLE paralysis 2/2 spinal cord injury in 1987 after a skiing accident w/ LLE brace, p/w mechanical fall.  Seen by PT, recs made for Rehab as pt needs moderate assist to get OOB and to ambulate.  Pt was seen by Cardiology for noted bradycardia, pt is asymptomatic and no interventions needed,  Seen by neurosurgery, MRI and CT scan of cervical thoracic and lumbar spine , which were all neg, except for noted DISH and ankylosing spondylosis.  H/O spinal surgery in the past and imaging negative for any hardware malalignments or fractures, CT head neg for any disease process.  His left arm is in a sling, unable to make a fist with his left hand, which he could accomplish prior.  He is to f/u with Dr Kraus for nerve reassessments in 6 weeks.  Pt is ready for rehab today.     PHYSICAL EXAM:    GENERAL: Comfortable, no acute distress   HEAD:  Normocephalic, atraumatic  EYES: EOMI, PERRLA  HEENT: Moist mucous membranes  NECK: Supple, No JVD  NERVOUS SYSTEM:  Alert & Oriented X3, Motor Strength 5/5 RUE, upper and lower extremities, LUE: in sling. radial pulse +.  difficulty forming a fist,  LLE weakness from prior accident  CHEST/LUNG: Clear to auscultation bilaterally  HEART: Regular rate and rhythm  ABDOMEN: Soft, non tender, Nondistended, Bowel sounds present  GENITOURINARY: has condom catheter  EXTREMITIES:   No clubbing, cyanosis, or edema  MUSCULOSKELETAL- No muscle tenderness, no joint tenderness  SKIN-no rash    < from: MR Head No Cont (11.28.24 @ 09:46) >    IMPRESSION:  No acute intracranial hemorrhage or acute infarct.    < from: CT Thoracic Spine No Cont (11.26.24 @ 14:56) >    IMPRESSION:    1.  CERVICAL SPINE:  No acute cervical vertebral fracture.   Lower   cervical and upper thoracic posterior stabilization has routine   postoperative appearance    2.  THORACIC SPINE:  No acute thoracic vertebral fracture.  Widespread   mild thoracic degenerative disc disease    3.  LUMBAR SPINE:  No acute lumbar vertebral fracture.   Lumbar   degenerative disc disease results in central canal and foraminal stenosis   at L3-L4 and L4-L5. Grade one degenerative anterior listhesis L4 on L5      64 M with Hx of LLE paralysis 2/2 spinal cord injury in 1987 w/ LLE brace, s/p spine surgery with hardware, presented with mechanical falls complicated by an anterior left shoulder dislocation.      Acute Left Anterior Shoulder Dislocation X 2 after a Mechanical Falls  -  last dislocation occurred on 11/24  -  seen by ortho at that time and there was concern for brachial plexus injury with new onset of lack of elbow flexion and wrist extension, with diff making a fist with left hand  -  may take prolonged period of time for nerve function to recover  -  s/p closed reduction on 11/24  -  recs per ortho:  NWB LUE, sling and swath, shoulder immobilizer was ordered  -  will need nerve conduction studies in 6 weeks  -  maintain left arm in sling  -  neurovascular checks  -  fall precautions  -  pain control  -  follow up outpatient with Dr. Kraus    S/P Mechanical Falls  -  no acute injuries other than the left shoulder dislocation  -  appreciate NS and neurology consults  -  CT imaging of the cervical, thoracic and lumbar spine reviewed -> no evidence of acute vertebral fractures or acute pathology  -  notes of NS reviewed - no concerns for loosening, disruption or misalignment of hardware  -  does not appear that falls are related to any loosening or disruption of spine hardware  -  fall precautions  -  PT as tolerated  -  MRI brain reviewed:  extensive bilateral gliosis and encephalomalacia, no bleed, mass effect, fluid collection or infarct    Bradycardia  -  pt asymptomatic  -  cardio consult appreciated  -  TSH normal  -  pt to f/u with cardiology in 4 weeks  -  ECHO WNL, LVEF 66%    RUE with intermittent jerking type movements  -  appreciate neuro consult  -  CT brain:  Mild to moderate chronic microvascular changes without evidence of an acute transcortical infarction or hemorrhage. Chronic changes  -  resolved  -  low suspicion for seizures    UTI  -  s/p IV Rocephin X 3 days, completed 11/24  -  urine CX -> Staph Aureus (MSSA)  -  blood cultures negative  -  ECHO negative  - has condom catheter, had diff using urinal    DVT prophylaxis  -  venodynes and Lovenox    Code Status:  Full Code    Dispo:  will need rehab short-term rehab, does not qualify for acute rehab, CM sent referrals, waiting to hear back as will need acceptance and authorization

## 2024-12-02 NOTE — DISCHARGE NOTE PROVIDER - NSDCMRMEDTOKEN_GEN_ALL_CORE_FT
Lovenox 40 mg/0.4 mL injectable solution: 40 milligram(s) subcutaneously once a day  oxyCODONE 5 mg oral tablet: 1 tab(s) orally every 4 hours as needed for moderate to severe pain  Tylenol Regular Strength 325 mg oral tablet: 2 tab(s) orally every 6 hours As needed Temp greater or equal to 38C (100.4F), Mild Pain (1 - 3)

## 2024-12-02 NOTE — DISCHARGE NOTE PROVIDER - CARE PROVIDER_API CALL
Alena Kraus  Orthopaedic Surgery  [address] [place] [address]  Phone: [phone]  Fax: [phone]  Follow Up Time: 1 month

## 2024-12-08 DIAGNOSIS — N39.0 URINARY TRACT INFECTION, SITE NOT SPECIFIED: ICD-10-CM

## 2024-12-08 DIAGNOSIS — R25.1 TREMOR, UNSPECIFIED: ICD-10-CM

## 2024-12-08 DIAGNOSIS — R29.6 REPEATED FALLS: ICD-10-CM

## 2024-12-08 DIAGNOSIS — H91.90 UNSPECIFIED HEARING LOSS, UNSPECIFIED EAR: ICD-10-CM
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
## 2024-12-08 DIAGNOSIS — G81.94 HEMIPLEGIA, UNSPECIFIED AFFECTING LEFT NONDOMINANT SIDE: ICD-10-CM

## 2024-12-08 DIAGNOSIS — I49.5 SICK SINUS SYNDROME: ICD-10-CM
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
## 2024-12-08 DIAGNOSIS — R00.1 BRADYCARDIA, UNSPECIFIED: ICD-10-CM

## 2024-12-08 DIAGNOSIS — Z87.820 PERSONAL HISTORY OF TRAUMATIC BRAIN INJURY: ICD-10-CM

## 2024-12-08 DIAGNOSIS — M45.6 ANKYLOSING SPONDYLITIS LUMBAR REGION: ICD-10-CM

## 2024-12-08 DIAGNOSIS — S43.015A ANTERIOR DISLOCATION OF LEFT HUMERUS, INITIAL ENCOUNTER: ICD-10-CM

## 2024-12-08 DIAGNOSIS — B95.61 METHICILLIN SUSCEPTIBLE STAPHYLOCOCCUS AUREUS INFECTION AS THE CAUSE OF DISEASES CLASSIFIED ELSEWHERE: ICD-10-CM

## 2024-12-08 DIAGNOSIS — I45.10 UNSPECIFIED RIGHT BUNDLE-BRANCH BLOCK: ICD-10-CM

## 2024-12-10 ENCOUNTER — APPOINTMENT (OUTPATIENT)
Dept: SPEECH THERAPY | Facility: CLINIC | Age: 64
End: 2024-12-10

## 2024-12-10 ENCOUNTER — NON-APPOINTMENT (OUTPATIENT)
Age: 64
End: 2024-12-10

## 2024-12-10 ENCOUNTER — OUTPATIENT (OUTPATIENT)
Dept: OUTPATIENT SERVICES | Facility: HOSPITAL | Age: 64
LOS: 1 days | Discharge: HOME | End: 2024-12-10

## 2024-12-20 ENCOUNTER — APPOINTMENT (OUTPATIENT)
Dept: MRI IMAGING | Facility: CLINIC | Age: 64
End: 2024-12-20

## 2024-12-20 ENCOUNTER — APPOINTMENT (OUTPATIENT)
Dept: CT IMAGING | Facility: CLINIC | Age: 64
End: 2024-12-20

## 2024-12-23 DIAGNOSIS — H90.3 SENSORINEURAL HEARING LOSS, BILATERAL: ICD-10-CM

## 2025-01-03 ENCOUNTER — NON-APPOINTMENT (OUTPATIENT)
Age: 65
End: 2025-01-03

## 2025-01-16 ENCOUNTER — EMERGENCY (EMERGENCY)
Facility: HOSPITAL | Age: 65
LOS: 0 days | Discharge: HOME | End: 2025-01-17
Attending: FAMILY MEDICINE
Payer: MEDICARE

## 2025-01-16 VITALS
OXYGEN SATURATION: 95 % | DIASTOLIC BLOOD PRESSURE: 71 MMHG | RESPIRATION RATE: 16 BRPM | TEMPERATURE: 98 F | SYSTOLIC BLOOD PRESSURE: 162 MMHG | HEART RATE: 59 BPM | HEIGHT: 70 IN

## 2025-01-16 DIAGNOSIS — M21.921 UNSPECIFIED ACQUIRED DEFORMITY OF RIGHT UPPER ARM: ICD-10-CM

## 2025-01-16 DIAGNOSIS — R07.89 OTHER CHEST PAIN: ICD-10-CM

## 2025-01-16 DIAGNOSIS — M21.941 UNSPECIFIED ACQUIRED DEFORMITY OF HAND, RIGHT HAND: ICD-10-CM

## 2025-01-16 LAB
ALBUMIN SERPL ELPH-MCNC: 3 G/DL — LOW (ref 3.3–5)
ALP SERPL-CCNC: 102 U/L — SIGNIFICANT CHANGE UP (ref 40–120)
ALT FLD-CCNC: 28 U/L — SIGNIFICANT CHANGE UP (ref 12–78)
ANION GAP SERPL CALC-SCNC: 3 MMOL/L — LOW (ref 5–17)
APTT BLD: 37.1 SEC — HIGH (ref 24.5–35.6)
AST SERPL-CCNC: 20 U/L — SIGNIFICANT CHANGE UP (ref 15–37)
BASOPHILS # BLD AUTO: 0.02 K/UL — SIGNIFICANT CHANGE UP (ref 0–0.2)
BASOPHILS NFR BLD AUTO: 0.3 % — SIGNIFICANT CHANGE UP (ref 0–2)
BILIRUB SERPL-MCNC: 0.2 MG/DL — SIGNIFICANT CHANGE UP (ref 0.2–1.2)
BLD GP AB SCN SERPL QL: SIGNIFICANT CHANGE UP
BUN SERPL-MCNC: 22 MG/DL — SIGNIFICANT CHANGE UP (ref 7–23)
CALCIUM SERPL-MCNC: 9.5 MG/DL — SIGNIFICANT CHANGE UP (ref 8.5–10.1)
CHLORIDE SERPL-SCNC: 106 MMOL/L — SIGNIFICANT CHANGE UP (ref 96–108)
CO2 SERPL-SCNC: 28 MMOL/L — SIGNIFICANT CHANGE UP (ref 22–31)
CREAT SERPL-MCNC: 0.76 MG/DL — SIGNIFICANT CHANGE UP (ref 0.5–1.3)
D DIMER BLD IA.RAPID-MCNC: 454 NG/ML DDU — HIGH
EGFR: 100 ML/MIN/1.73M2 — SIGNIFICANT CHANGE UP
EOSINOPHIL # BLD AUTO: 0.15 K/UL — SIGNIFICANT CHANGE UP (ref 0–0.5)
EOSINOPHIL NFR BLD AUTO: 2.4 % — SIGNIFICANT CHANGE UP (ref 0–6)
GLUCOSE SERPL-MCNC: 152 MG/DL — HIGH (ref 70–99)
HCT VFR BLD CALC: 38.8 % — LOW (ref 39–50)
HGB BLD-MCNC: 12.8 G/DL — LOW (ref 13–17)
IMM GRANULOCYTES NFR BLD AUTO: 1 % — HIGH (ref 0–0.9)
INR BLD: 1.07 RATIO — SIGNIFICANT CHANGE UP (ref 0.85–1.16)
LYMPHOCYTES # BLD AUTO: 1.39 K/UL — SIGNIFICANT CHANGE UP (ref 1–3.3)
LYMPHOCYTES # BLD AUTO: 22.2 % — SIGNIFICANT CHANGE UP (ref 13–44)
MCHC RBC-ENTMCNC: 32.2 PG — SIGNIFICANT CHANGE UP (ref 27–34)
MCHC RBC-ENTMCNC: 33 G/DL — SIGNIFICANT CHANGE UP (ref 32–36)
MCV RBC AUTO: 97.5 FL — SIGNIFICANT CHANGE UP (ref 80–100)
MONOCYTES # BLD AUTO: 0.51 K/UL — SIGNIFICANT CHANGE UP (ref 0–0.9)
MONOCYTES NFR BLD AUTO: 8.2 % — SIGNIFICANT CHANGE UP (ref 2–14)
NEUTROPHILS # BLD AUTO: 4.12 K/UL — SIGNIFICANT CHANGE UP (ref 1.8–7.4)
NEUTROPHILS NFR BLD AUTO: 65.9 % — SIGNIFICANT CHANGE UP (ref 43–77)
NT-PROBNP SERPL-SCNC: 18 PG/ML — SIGNIFICANT CHANGE UP (ref 0–125)
PLATELET # BLD AUTO: 326 K/UL — SIGNIFICANT CHANGE UP (ref 150–400)
POTASSIUM SERPL-MCNC: 3.8 MMOL/L — SIGNIFICANT CHANGE UP (ref 3.5–5.3)
POTASSIUM SERPL-SCNC: 3.8 MMOL/L — SIGNIFICANT CHANGE UP (ref 3.5–5.3)
PROT SERPL-MCNC: 7.2 GM/DL — SIGNIFICANT CHANGE UP (ref 6–8.3)
PROTHROM AB SERPL-ACNC: 12.3 SEC — SIGNIFICANT CHANGE UP (ref 9.9–13.4)
RBC # BLD: 3.98 M/UL — LOW (ref 4.2–5.8)
RBC # FLD: 13.2 % — SIGNIFICANT CHANGE UP (ref 10.3–14.5)
SODIUM SERPL-SCNC: 137 MMOL/L — SIGNIFICANT CHANGE UP (ref 135–145)
TROPONIN I, HIGH SENSITIVITY RESULT: 7.54 NG/L — SIGNIFICANT CHANGE UP
WBC # BLD: 6.25 K/UL — SIGNIFICANT CHANGE UP (ref 3.8–10.5)
WBC # FLD AUTO: 6.25 K/UL — SIGNIFICANT CHANGE UP (ref 3.8–10.5)

## 2025-01-16 PROCEDURE — 84484 ASSAY OF TROPONIN QUANT: CPT

## 2025-01-16 PROCEDURE — 99285 EMERGENCY DEPT VISIT HI MDM: CPT

## 2025-01-16 PROCEDURE — 85730 THROMBOPLASTIN TIME PARTIAL: CPT

## 2025-01-16 PROCEDURE — 71275 CT ANGIOGRAPHY CHEST: CPT | Mod: MC

## 2025-01-16 PROCEDURE — 86901 BLOOD TYPING SEROLOGIC RH(D): CPT

## 2025-01-16 PROCEDURE — 99285 EMERGENCY DEPT VISIT HI MDM: CPT | Mod: 25

## 2025-01-16 PROCEDURE — 85379 FIBRIN DEGRADATION QUANT: CPT

## 2025-01-16 PROCEDURE — 93005 ELECTROCARDIOGRAM TRACING: CPT

## 2025-01-16 PROCEDURE — 80053 COMPREHEN METABOLIC PANEL: CPT

## 2025-01-16 PROCEDURE — 85610 PROTHROMBIN TIME: CPT

## 2025-01-16 PROCEDURE — 85025 COMPLETE CBC W/AUTO DIFF WBC: CPT

## 2025-01-16 PROCEDURE — 71275 CT ANGIOGRAPHY CHEST: CPT | Mod: 26

## 2025-01-16 PROCEDURE — 93010 ELECTROCARDIOGRAM REPORT: CPT

## 2025-01-16 PROCEDURE — 86900 BLOOD TYPING SEROLOGIC ABO: CPT

## 2025-01-16 PROCEDURE — 71045 X-RAY EXAM CHEST 1 VIEW: CPT | Mod: 26

## 2025-01-16 PROCEDURE — 71045 X-RAY EXAM CHEST 1 VIEW: CPT

## 2025-01-16 PROCEDURE — 96374 THER/PROPH/DIAG INJ IV PUSH: CPT

## 2025-01-16 PROCEDURE — 83880 ASSAY OF NATRIURETIC PEPTIDE: CPT

## 2025-01-16 PROCEDURE — 86850 RBC ANTIBODY SCREEN: CPT

## 2025-01-16 PROCEDURE — 36415 COLL VENOUS BLD VENIPUNCTURE: CPT

## 2025-01-16 RX ORDER — SODIUM CHLORIDE 9 MG/ML
1000 INJECTION, SOLUTION INTRAMUSCULAR; INTRAVENOUS; SUBCUTANEOUS ONCE
Refills: 0 | Status: COMPLETED | OUTPATIENT
Start: 2025-01-16 | End: 2025-01-16

## 2025-01-16 RX ORDER — SODIUM CHLORIDE 9 MG/ML
3 INJECTION, SOLUTION INTRAMUSCULAR; INTRAVENOUS; SUBCUTANEOUS ONCE
Refills: 0 | Status: COMPLETED | OUTPATIENT
Start: 2025-01-16 | End: 2025-01-16

## 2025-01-16 RX ADMIN — SODIUM CHLORIDE 3 MILLILITER(S): 9 INJECTION, SOLUTION INTRAMUSCULAR; INTRAVENOUS; SUBCUTANEOUS at 21:07

## 2025-01-16 RX ADMIN — SODIUM CHLORIDE 1000 MILLILITER(S): 9 INJECTION, SOLUTION INTRAMUSCULAR; INTRAVENOUS; SUBCUTANEOUS at 23:42

## 2025-01-16 NOTE — ED PROVIDER NOTE - NSFOLLOWUPINSTRUCTIONS_ED_ALL_ED_FT
You were found to have an area of atelectasis on your chest ct. We do not suspect a pneumonia at this time because your wbc , oxygen level and other labs are within normal limits. Please use incentive spirometer ten times per hour while awake to help inflate lungs. Follow up with your doctor as soon as possible. Please return to ER if you develop fever or shortness of breath.  Nonspecific Chest Pain, Adult  Chest pain is an uncomfortable, tight, or painful feeling in the chest. The pain can feel like a crushing, aching, or squeezing pressure. A person can feel a burning or tingling sensation. Chest pain can also be felt in your back, neck, jaw, shoulder, or arm. This pain can be worse when you move, sneeze, or take a deep breath.    Chest pain can be caused by a condition that is life-threatening. This must be treated right away. It can also be caused by something that is not life-threatening. If you have chest pain, it can be hard to know the difference, so it is important to get help right away to make sure that you do not have a serious condition.    Some life-threatening causes of chest pain include:  Heart attack.  A tear in the body's main blood vessel (aortic dissection).  Inflammation around your heart (pericarditis).  A problem in the lungs, such as a blood clot (pulmonary embolism) or a collapsed lung (pneumothorax).  Some non life-threatening causes of chest pain include:  Heartburn.  Anxiety or stress.  Damage to the bones, muscles, and cartilage that make up your chest wall.  Pneumonia or bronchitis.  Shingles infection (varicella-zoster virus).  Your chest pain may come and go. It may also be constant. Your health care provider will do tests and other studies to find the cause of your pain. Treatment will depend on the cause of your chest pain.    Follow these instructions at home:  Medicines    Take over-the-counter and prescription medicines only as told by your health care provider.  If you were prescribed an antibiotic medicine, take it as told by your health care provider. Do not stop taking the antibiotic even if you start to feel better.  Activity    Avoid any activities that cause chest pain.  Do not lift anything that is heavier than 10 lb (4.5 kg), or the limit that you are told, until your health care provider says that it is safe.  Rest as directed by your health care provider.  Return to your normal activities only as told by your health care provider. Ask your health care provider what activities are safe for you.  Lifestyle    A plate along with examples of foods in a healthy diet.  Silhouette of a person sitting on the floor doing yoga.  Do not use any products that contain nicotine or tobacco, such as cigarettes, e-cigarettes, and chewing tobacco. If you need help quitting, ask your health care provider.  Do not drink alcohol.  Make healthy lifestyle changes as recommended. These may include:  Getting regular exercise. Ask your health care provider to suggest some exercises that are safe for you.  Eating a heart-healthy diet. This includes plenty of fresh fruits and vegetables, whole grains, low-fat (lean) protein, and low-fat dairy products. A dietitian can help you find healthy eating options.  Maintaining a healthy weight.  Managing any other health conditions you may have, such as high blood pressure (hypertension) or diabetes.  Reducing stress, such as with yoga or relaxation techniques.  General instructions    Pay attention to any changes in your symptoms.  It is up to you to get the results of any tests that were done. Ask your health care provider, or the department that is doing the tests, when your results will be ready.  Keep all follow-up visits as told by your health care provider. This is important.  You may be asked to go for further testing if your chest pain does not go away.  Contact a health care provider if:  Your chest pain does not go away.  You feel depressed.  You have a fever.  You notice changes in your symptoms or develop new symptoms.  Get help right away if:  Your chest pain gets worse.  You have a cough that gets worse, or you cough up blood.  You have severe pain in your abdomen.  You faint.  You have sudden, unexplained chest discomfort.  You have sudden, unexplained discomfort in your arms, back, neck, or jaw.  You have shortness of breath at any time.  You suddenly start to sweat, or your skin gets clammy.  You feel nausea or you vomit.  You suddenly feel lightheaded or dizzy.  You have severe weakness, or unexplained weakness or fatigue.  Your heart begins to beat quickly, or it feels like it is skipping beats.  These symptoms may represent a serious problem that is an emergency. Do not wait to see if the symptoms will go away. Get medical help right away. Call your local emergency services (911 in the [place] Do not drive yourself to the hospital.    Summary  Chest pain can be caused by a condition that is serious and requires urgent treatment. It may also be caused by something that is not life-threatening.  Your health care provider may do lab tests and other studies to find the cause of your pain.  Follow your health care provider's instructions on taking medicines, making lifestyle changes, and getting emergency treatment if symptoms become worse.  Keep all follow-up visits as told by your health care provider. This includes visits for any further testing if your chest pain does not go away.  This information is not intended to replace advice given to you by your health care provider. Make sure you discuss any questions you have with your health care provider.    Document Revised: 11/02/2023 Document Reviewed: 11/02/2023  [employer] Patient Education © 2024 [employer]  [employer] logo  Terms and Conditions  Privacy Policy  Editorial Policy  All content on this site: Copyright © 2025 Elsevier, its licensors, and contributors. All rights are reserved, including those for text and data mining, AI training, and similar technologies. For all open access content, the Creative Commons licensing terms apply.  Cookies are used by this site. To decline or learn more, visit our Cookies page.

## 2025-01-16 NOTE — ED PROVIDER NOTE - PATIENT PORTAL LINK FT
You can access the FollowMyHealth Patient Portal offered by [facility] by registering at the following website: [email or web address]. By joining [employer]’s FollowMyHealth portal, you will also be able to view your health information using other applications (apps) compatible with our system.

## 2025-01-16 NOTE — ED PROVIDER NOTE - OBJECTIVE STATEMENT
pt is a 65 yo wm with hx C7 T1 fx , deafness (reads lips)recent falls with left shoulder dislocation and left hand fracture, removed two days ago, who is presently in rehab. pt states today did his usual pt but went back to room and developed midsternal cp nonrad with no sob . Lasted about two min then recurred. Cp free at present. Pt is concerned because there is fh of MI with dad.

## 2025-01-16 NOTE — ED PROVIDER NOTE - CLINICAL SUMMARY MEDICAL DECISION MAKING FREE TEXT BOX
pt is a 65 yo wm with hx C7 T1 fx , deafness (reads lips)recent falls with left shoulder dislocation and left hand fracture, removed two days ago, who is presently in rehab. pt states today did his usual pt but went back to room and developed midsternal cp nonrad with no sob . Lasted about two min then recurred. Cp free at present. Pt is concerned because there is fh of MI with dad.Labs, ekg, cxr.
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
## 2025-01-16 NOTE — ED ADULT NURSE REASSESSMENT NOTE - NS ED NURSE REASSESS COMMENT FT1
Pt unable to urinate. MD Riggs states to hang 1L of normal saline to get pt to urinate. MD states to hold off on straight cath at this time. Pt aware of need of urine sample.

## 2025-01-16 NOTE — ED ADULT NURSE NOTE - NSFALLHARMRISKINTERV_ED_ALL_ED
Assistance OOB with selected safe patient handling equipment if applicable/Communicate risk of Fall with Harm to all staff, patient, and family/Monitor gait and stability/Provide patient with walking aids/Provide visual cue: red socks, yellow wristband, yellow gown, etc/Reinforce activity limits and safety measures with patient and family/Bed in lowest position, wheels locked, appropriate side rails in place/Call bell, personal items and telephone in reach/Instruct patient to call for assistance before getting out of bed/chair/stretcher/Non-slip footwear applied when patient is off stretcher/[place] to call system/Physically safe environment - no spills, clutter or unnecessary equipment/Purposeful Proactive Rounding/Room/bathroom lighting operational, light cord in reach

## 2025-01-16 NOTE — ED ADULT NURSE NOTE - NS ED NURSE IV DC DT
Delivery with history of     History of cataract surgery  right eye 2012  History of colon resection  , cancer never confirmed per patient  History of left shoulder fracture  , fell down stairs, also fracture of pelvis  Inguinal hernia    S/P hip replacement  left   Shoulder disorder  fell shoulder surgery left  17-Jan-2025 03:34

## 2025-01-16 NOTE — ED ADULT NURSE NOTE - OBJECTIVE STATEMENT
Pt presents to ed c/o of chest pain since 1600. Pt states he was at PT for ambulation when he started to experience chests pain and sob. Pt denies sob and difficulty breathing at this time. Pt left wrist was recently broken unable to perform ROM, pt is paralyzed from the waist down. Pt is [place] able to read lips. Pt A&O x3.

## 2025-01-17 VITALS
RESPIRATION RATE: 18 BRPM | HEART RATE: 68 BPM | DIASTOLIC BLOOD PRESSURE: 79 MMHG | SYSTOLIC BLOOD PRESSURE: 134 MMHG | OXYGEN SATURATION: 97 % | TEMPERATURE: 98 F

## 2025-01-17 LAB
ABO RH CONFIRMATION: SIGNIFICANT CHANGE UP
TROPONIN I, HIGH SENSITIVITY RESULT: 10.19 NG/L — SIGNIFICANT CHANGE UP

## 2025-02-11 ENCOUNTER — APPOINTMENT (OUTPATIENT)
Dept: MRI IMAGING | Facility: CLINIC | Age: 65
End: 2025-02-11

## 2025-02-17 ENCOUNTER — APPOINTMENT (OUTPATIENT)
Dept: MRI IMAGING | Facility: CLINIC | Age: 65
End: 2025-02-17

## 2025-03-05 ENCOUNTER — APPOINTMENT (OUTPATIENT)
Dept: CT IMAGING | Facility: CLINIC | Age: 65
End: 2025-03-05
Payer: MEDICARE

## 2025-03-05 ENCOUNTER — OUTPATIENT (OUTPATIENT)
Dept: OUTPATIENT SERVICES | Facility: HOSPITAL | Age: 65
LOS: 1 days | End: 2025-03-05
Payer: MEDICARE

## 2025-03-05 DIAGNOSIS — H90.3 SENSORINEURAL HEARING LOSS, BILATERAL: ICD-10-CM

## 2025-03-05 PROCEDURE — 70480 CT ORBIT/EAR/FOSSA W/O DYE: CPT | Mod: 26

## 2025-03-05 PROCEDURE — 70480 CT ORBIT/EAR/FOSSA W/O DYE: CPT

## 2025-03-12 ENCOUNTER — NON-APPOINTMENT (OUTPATIENT)
Age: 65
End: 2025-03-12

## 2025-03-18 ENCOUNTER — NON-APPOINTMENT (OUTPATIENT)
Age: 65
End: 2025-03-18

## 2025-03-24 ENCOUNTER — APPOINTMENT (OUTPATIENT)
Dept: OTOLARYNGOLOGY | Facility: CLINIC | Age: 65
End: 2025-03-24

## 2025-03-24 DIAGNOSIS — H90.3 SENSORINEURAL HEARING LOSS, BILATERAL: ICD-10-CM

## 2025-03-24 PROCEDURE — 92567 TYMPANOMETRY: CPT

## 2025-03-24 PROCEDURE — 92557 COMPREHENSIVE HEARING TEST: CPT

## 2025-03-24 PROCEDURE — 99214 OFFICE O/P EST MOD 30 MIN: CPT

## 2025-05-12 ENCOUNTER — NON-APPOINTMENT (OUTPATIENT)
Age: 65
End: 2025-05-12

## 2025-05-14 ENCOUNTER — APPOINTMENT (OUTPATIENT)
Dept: OTOLARYNGOLOGY | Facility: CLINIC | Age: 65
End: 2025-05-14
Payer: MEDICARE

## 2025-05-14 VITALS
BODY MASS INDEX: 30.36 KG/M2 | HEIGHT: 69 IN | WEIGHT: 205 LBS | HEART RATE: 51 BPM | DIASTOLIC BLOOD PRESSURE: 84 MMHG | SYSTOLIC BLOOD PRESSURE: 169 MMHG

## 2025-05-14 DIAGNOSIS — H90.3 SENSORINEURAL HEARING LOSS, BILATERAL: ICD-10-CM

## 2025-05-14 PROCEDURE — 90471 IMMUNIZATION ADMIN: CPT

## 2025-05-14 PROCEDURE — 99213 OFFICE O/P EST LOW 20 MIN: CPT | Mod: 25

## 2025-05-14 PROCEDURE — 90677 PCV20 VACCINE IM: CPT

## 2025-05-14 RX ORDER — TAMSULOSIN HYDROCHLORIDE 0.4 MG/1
CAPSULE ORAL
Refills: 0 | Status: ACTIVE | COMMUNITY

## 2025-05-14 RX ORDER — AMLODIPINE BESYLATE 5 MG/1
TABLET ORAL
Refills: 0 | Status: ACTIVE | COMMUNITY

## 2025-05-21 NOTE — ASU PATIENT PROFILE, ADULT - FALL HARM RISK - HARM RISK INTERVENTIONS
Sunscreen Recommendations: Daily application, spf 30+
Detail Level: Detailed
Detail Level: Zone
Detail Level: Generalized
Assistance OOB with selected safe patient handling equipment/Communicate Risk of Fall with Harm to all staff/Discuss with provider need for PT consult/Monitor gait and stability/Provide patient with walking aids - walker, cane, crutches/Reinforce activity limits and safety measures with patient and family/Tailored Fall Risk Interventions/Visual Cue: Yellow wristband and red socks/Bed in lowest position, wheels locked, appropriate side rails in place/Call bell, personal items and telephone in reach/Instruct patient to call for assistance before getting out of bed or chair/Non-slip footwear when patient is out of bed/[place] to call system/Physically safe environment - no spills, clutter or unnecessary equipment/Purposeful Proactive Rounding/Room/bathroom lighting operational, light cord in reach

## 2025-05-22 ENCOUNTER — APPOINTMENT (OUTPATIENT)
Dept: OTOLARYNGOLOGY | Facility: HOSPITAL | Age: 65
End: 2025-05-22

## 2025-05-22 ENCOUNTER — APPOINTMENT (OUTPATIENT)
Dept: SPEECH THERAPY | Facility: HOSPITAL | Age: 65
End: 2025-05-22

## 2025-05-22 ENCOUNTER — INPATIENT (INPATIENT)
Facility: HOSPITAL | Age: 65
LOS: 0 days | Discharge: HOME | End: 2025-05-23
Attending: OTOLARYNGOLOGY | Admitting: OTOLARYNGOLOGY
Payer: MEDICARE

## 2025-05-22 VITALS
HEIGHT: 70 IN | HEART RATE: 94 BPM | RESPIRATION RATE: 16 BRPM | TEMPERATURE: 97 F | OXYGEN SATURATION: 94 % | WEIGHT: 205.03 LBS | DIASTOLIC BLOOD PRESSURE: 68 MMHG | SYSTOLIC BLOOD PRESSURE: 139 MMHG

## 2025-05-22 DIAGNOSIS — H90.3 SENSORINEURAL HEARING LOSS, BILATERAL: ICD-10-CM

## 2025-05-22 PROCEDURE — 70250 X-RAY EXAM OF SKULL: CPT | Mod: 26

## 2025-05-22 DEVICE — IMP NUCLEUS PLUS W/SLIM STRT ELECT CI622 ORDER MULT OF 5: Type: IMPLANTABLE DEVICE | Status: FUNCTIONAL

## 2025-05-22 DEVICE — BONE WAX 2.5GM: Type: IMPLANTABLE DEVICE | Status: FUNCTIONAL

## 2025-05-22 DEVICE — KIT SOUND PROCESSOR DUAL N8 CP1110: Type: IMPLANTABLE DEVICE | Status: FUNCTIONAL

## 2025-05-22 DEVICE — SCREW CORTEX S-T TI N/S 1.3X4MM: Type: IMPLANTABLE DEVICE | Status: FUNCTIONAL

## 2025-05-22 DEVICE — SURGIFOAM 8 X 12.25CM X 2MM: Type: IMPLANTABLE DEVICE | Status: FUNCTIONAL

## 2025-05-22 RX ORDER — CEFDINIR 250 MG/5ML
1 POWDER, FOR SUSPENSION ORAL
Qty: 20 | Refills: 0
Start: 2025-05-22 | End: 2025-05-31

## 2025-05-22 RX ORDER — FENTANYL CITRATE-0.9 % NACL/PF 100MCG/2ML
50 SYRINGE (ML) INTRAVENOUS
Refills: 0 | Status: DISCONTINUED | OUTPATIENT
Start: 2025-05-22 | End: 2025-05-22

## 2025-05-22 RX ORDER — ACETAMINOPHEN 500 MG/5ML
650 LIQUID (ML) ORAL EVERY 6 HOURS
Refills: 0 | Status: DISCONTINUED | OUTPATIENT
Start: 2025-05-22 | End: 2025-05-23

## 2025-05-22 RX ORDER — TETRACAINE HYDROCHLORIDE 5 MG/ML
1 SOLUTION OPHTHALMIC ONCE
Refills: 0 | Status: COMPLETED | OUTPATIENT
Start: 2025-05-22 | End: 2025-05-22

## 2025-05-22 RX ORDER — ONDANSETRON HCL/PF 4 MG/2 ML
4 VIAL (ML) INJECTION ONCE
Refills: 0 | Status: DISCONTINUED | OUTPATIENT
Start: 2025-05-22 | End: 2025-05-22

## 2025-05-22 RX ORDER — TRAMADOL HYDROCHLORIDE AND ACETAMINOPHEN 37.5; 325 MG/1; MG/1
2 TABLET ORAL
Qty: 40 | Refills: 0
Start: 2025-05-22 | End: 2025-05-26

## 2025-05-22 RX ORDER — OXYCODONE HYDROCHLORIDE 30 MG/1
10 TABLET ORAL EVERY 6 HOURS
Refills: 0 | Status: DISCONTINUED | OUTPATIENT
Start: 2025-05-22 | End: 2025-05-23

## 2025-05-22 RX ORDER — HYDROMORPHONE/SOD CHLOR,ISO/PF 2 MG/10 ML
0.5 SYRINGE (ML) INJECTION
Refills: 0 | Status: DISCONTINUED | OUTPATIENT
Start: 2025-05-22 | End: 2025-05-22

## 2025-05-22 RX ORDER — HYDROMORPHONE/SOD CHLOR,ISO/PF 2 MG/10 ML
1 SYRINGE (ML) INJECTION
Refills: 0 | Status: DISCONTINUED | OUTPATIENT
Start: 2025-05-22 | End: 2025-05-23

## 2025-05-22 RX ORDER — LANOLIN/MINERAL OIL/PETROLATUM
1 OINTMENT (GRAM) OPHTHALMIC (EYE)
Refills: 0 | Status: DISCONTINUED | OUTPATIENT
Start: 2025-05-22 | End: 2025-05-23

## 2025-05-22 RX ORDER — OXYCODONE HYDROCHLORIDE 30 MG/1
5 TABLET ORAL ONCE
Refills: 0 | Status: DISCONTINUED | OUTPATIENT
Start: 2025-05-22 | End: 2025-05-22

## 2025-05-22 RX ORDER — ENOXAPARIN SODIUM 100 MG/ML
40 INJECTION SUBCUTANEOUS EVERY 24 HOURS
Refills: 0 | Status: DISCONTINUED | OUTPATIENT
Start: 2025-05-22 | End: 2025-05-23

## 2025-05-22 RX ORDER — OXYCODONE HYDROCHLORIDE 30 MG/1
5 TABLET ORAL EVERY 6 HOURS
Refills: 0 | Status: DISCONTINUED | OUTPATIENT
Start: 2025-05-22 | End: 2025-05-23

## 2025-05-22 RX ORDER — APREPITANT 40 MG/1
40 CAPSULE ORAL ONCE
Refills: 0 | Status: COMPLETED | OUTPATIENT
Start: 2025-05-22 | End: 2025-05-22

## 2025-05-22 RX ORDER — OFLOXACIN 3 MG/ML
1 SOLUTION OPHTHALMIC EVERY 6 HOURS
Refills: 0 | Status: DISCONTINUED | OUTPATIENT
Start: 2025-05-22 | End: 2025-05-23

## 2025-05-22 RX ADMIN — ENOXAPARIN SODIUM 40 MILLIGRAM(S): 100 INJECTION SUBCUTANEOUS at 18:24

## 2025-05-22 RX ADMIN — OFLOXACIN 1 DROP(S): 3 SOLUTION OPHTHALMIC at 18:23

## 2025-05-22 RX ADMIN — APREPITANT 40 MILLIGRAM(S): 40 CAPSULE ORAL at 09:32

## 2025-05-22 RX ADMIN — TETRACAINE HYDROCHLORIDE 1 DROP(S): 5 SOLUTION OPHTHALMIC at 18:23

## 2025-05-22 NOTE — ASU PREOP CHECKLIST - HAIR REMOVAL
PA for Coumadin 1 mg and 5 mg done via phone. Will wait for determination    
hair removal not indicated

## 2025-05-22 NOTE — ASU DISCHARGE PLAN (ADULT/PEDIATRIC) - CARE PROVIDER_API CALL
Vitor Hilliard  Otolaryngology  [address] [place] [id number]  Phone: [phone]  Fax: [phone]  Follow Up Time:

## 2025-05-22 NOTE — H&P ADULT - NSHPPHYSICALEXAM_GEN_ALL_CORE
PHYSICAL EXAM:    CONSTITUTIONAL: Well nourished, well developed, NON-DYSMORPHIC, and in no acute distress.    HEAD: normocephalic, atraumatic.  EARS: The right/left pinna was normal.   NOSE: Normal external nose.  RESPIRATORY: Respirations unlabored, no increased work of breathing with use of accessory muscles and retractions. No stridor.  CARDIAC: Warm extremities, no cyanosis.

## 2025-05-22 NOTE — ASU DISCHARGE PLAN (ADULT/PEDIATRIC) - FINANCIAL ASSISTANCE
[facility] provides services at a reduced cost to those who are determined to be eligible through [facility]’s financial assistance program. Information regarding [facility]’s financial assistance program can be found by going to [email or web address] or by calling [phone].

## 2025-05-22 NOTE — ASU DISCHARGE PLAN (ADULT/PEDIATRIC) - NS MD DC FALL RISK RISK
For information on Fall & Injury Prevention, visit: [email or web address] OR  [email or web address] OR  https://www.cdc.gov/steadi/patient.html

## 2025-05-22 NOTE — CHART NOTE - NSCHARTNOTEFT_GEN_A_CORE
Patient complaining of RIGHT eye pain.  Tetracaine 1drop instilled into affected eye WITH relief.  Ofloxacin opthalmic 1drop q6h q2exnkx ordered per Corneal Abrasion Protocol.

## 2025-05-22 NOTE — ASU PREOP CHECKLIST - 2.
Pt transported from wheelchair to bed with bhakti lift. Pt transported from wheelchair to bed with Mike lift.

## 2025-05-22 NOTE — ASU PREOP CHECKLIST - 1.
belongings in locker 21 belongings in locker 21/ hearing aide sister Felicia took it cup given belongings in locker 21/ hearing aide to be worn into OR as per Dr Hilliard

## 2025-05-23 ENCOUNTER — NON-APPOINTMENT (OUTPATIENT)
Age: 65
End: 2025-05-23

## 2025-05-23 ENCOUNTER — TRANSCRIPTION ENCOUNTER (OUTPATIENT)
Age: 65
End: 2025-05-23

## 2025-05-23 ENCOUNTER — EMERGENCY (EMERGENCY)
Facility: HOSPITAL | Age: 65
LOS: 1 days | End: 2025-05-23
Attending: PERSONAL EMERGENCY RESPONSE ATTENDANT | Admitting: PERSONAL EMERGENCY RESPONSE ATTENDANT
Payer: MEDICARE

## 2025-05-23 VITALS
HEART RATE: 70 BPM | TEMPERATURE: 98 F | OXYGEN SATURATION: 98 % | DIASTOLIC BLOOD PRESSURE: 72 MMHG | SYSTOLIC BLOOD PRESSURE: 143 MMHG | RESPIRATION RATE: 18 BRPM

## 2025-05-23 VITALS
SYSTOLIC BLOOD PRESSURE: 178 MMHG | OXYGEN SATURATION: 100 % | DIASTOLIC BLOOD PRESSURE: 83 MMHG | HEART RATE: 65 BPM | RESPIRATION RATE: 20 BRPM | TEMPERATURE: 98 F

## 2025-05-23 VITALS
TEMPERATURE: 98 F | SYSTOLIC BLOOD PRESSURE: 171 MMHG | DIASTOLIC BLOOD PRESSURE: 74 MMHG | HEART RATE: 71 BPM | OXYGEN SATURATION: 96 % | WEIGHT: 210.1 LBS | HEIGHT: 70 IN | RESPIRATION RATE: 19 BRPM

## 2025-05-23 PROCEDURE — 99284 EMERGENCY DEPT VISIT MOD MDM: CPT

## 2025-05-23 PROCEDURE — 99239 HOSP IP/OBS DSCHRG MGMT >30: CPT

## 2025-05-23 RX ORDER — OFLOXACIN 3 MG/ML
1 SOLUTION OPHTHALMIC
Qty: 0 | Refills: 0 | DISCHARGE
Start: 2025-05-23

## 2025-05-23 RX ADMIN — OFLOXACIN 1 DROP(S): 3 SOLUTION OPHTHALMIC at 00:08

## 2025-05-23 RX ADMIN — OFLOXACIN 1 DROP(S): 3 SOLUTION OPHTHALMIC at 05:01

## 2025-05-23 NOTE — ED PROVIDER NOTE - PROGRESS NOTE DETAILS
Evaluated by ENT. Afrin, surgicell, and steri strips placed. Discussed with social work, will help arrange transport.  Byron Duckworth PGY-3

## 2025-05-23 NOTE — ED PROVIDER NOTE - ATTENDING CONTRIBUTION TO CARE
Attending MD Echeverria:  I performed a history and physical exam of the patient and discussed their management with the resident. I reviewed the resident's note and agree with the documented findings and plan of care. My medical decision making and observations are found above.    Attending MD Echeverria.  Agree with above.  Pt is a 65 yo male with pmhx of L cochlear implant surg yesterday now presenting to ED with complaint of blood ooze from ear.  Pt not bleeding on arrvial to ED.  Dried blood noted to neck.  Pt afebrile and otherwise well appearing.  Bleeding discontinued on arrival.  ENT in house and has redressed wound with gauze/afrin and supplied materials for redressing at home.  Blood volume scant and c/w repositioning and ooze per ENT.  No operative concern.  ENT rec d/c home with outpt f/u as indicated and return precautions.

## 2025-05-23 NOTE — ED PROVIDER NOTE - CLINICAL SUMMARY MEDICAL DECISION MAKING FREE TEXT BOX
64-year-old male status post cochlear implant surgery yesterday, bedbound, presents with oozing from incision site today.  Per aide at bedside, difficulty transporting patient, incision site with noted blood.  Bleeding controlled at this time.  Noted oozing to bottom of surgical incision site.  ENT at bedside.  Pending ENT evaluation, reassess.

## 2025-05-23 NOTE — ED PROVIDER NOTE - PHYSICAL EXAMINATION
GEN: Non-toxic appearing.  HEENT: normocephalic, atraumatic, s/p left cochlear implant with oozing from bottom incision site  CARDIAC: RRR, S1, S2, no murmur. Radial pulses present and symmetric b/l  PULM: CTA B/L no wheeze, rhonchi, rales.   ABD: nondistended  MSK: no edema  NEURO: no focal neurological deficits  SKIN: warm, dry, no rash.

## 2025-05-23 NOTE — CONSULT NOTE ADULT - SUBJECTIVE AND OBJECTIVE BOX
OTOLARYNGOLOGY (ENT) CONSULTATION NOTE    PATIENT: PABLO TORIBIO     MRN: [id number]       : 58  DATE OF ADMISSION:25  DATE OF SERVICE:  25 @ 14:16    HISTORY OF PRESENT ILLNESS:  PABLO TORIBIO  is a 64y Male s/p left CI 25. Discharged this morning but started having mild bleeding from incision while being transported into his car.     PAST MEDICAL HISTORY:  Deaf        CURRENT MEDICATIONS       HOME MEDICATIONS:  Lovenox 40 mg/0.4 mL injectable solution  ofloxacin 0.3% ophthalmic solution      ALLERGIES:  No Known Allergies    SOCIAL HISTORY: Pertinent included in HPI   FAMILY HISTORY: Pertinent included in HPI       SURGICAL HISTORY: Pertinent included in HPI       Physical Exam:  Gen: NAD  Ear: left postauricular incision with steri strip, oozing from inferior incision  Resp: Breathing comfortably on RA

## 2025-05-23 NOTE — ED ADULT NURSE NOTE - OBJECTIVE STATEMENT
63 yo M received to rm 2- s/p L cochlear implant sx yesterday, endorses bleeding with no bleeding at time of assessment, pt deaf- family @ bedside, ENT consult in progress, pending dispo

## 2025-05-23 NOTE — CONSULT NOTE ADULT - ASSESSMENT
PABLO TORIBIO  is a 64y Male s/p left CI 5/22/25. Discharged this morning but started having mild bleeding from incision while being transported into his car. On exam, slight oozing from inferior incision which was stopped with afrin and surgicel.    PLAN:  - Dc home with afrin prn  - Steri strips replaced

## 2025-05-23 NOTE — PROGRESS NOTE ADULT - ASSESSMENT
64yM s/p left CI 5/22/25. Doing well this morning.    -dc home  -post op instructions in arash  -abx sent to vivo

## 2025-05-23 NOTE — ED PROVIDER NOTE - PATIENT PORTAL LINK FT
You can access the FollowMyHealth Patient Portal offered by [facility] by registering at the following website: [email or web address]. By joining [employer]’s FollowMyHealth portal, you will also be able to view your health information using other applications (apps) compatible with our system.

## 2025-05-23 NOTE — DISCHARGE NOTE PROVIDER - CARE PROVIDER_API CALL
Vitor Hilliard  Otolaryngology  [address] [place] [id number]  Phone: [phone]  Fax: [phone]  Follow Up Time:

## 2025-05-23 NOTE — DISCHARGE NOTE PROVIDER - NSDCHOSPICE_GEN_A_CORE
[facility] Nephrology Daily Progress Note    Date of Service  2/4/2020    Chief Complaint  Follow up ESRD    Interval Problem Update  50 yo female PMH ESRD MWF iHD, HTN, type 2 DM, anemia of CKD, and CKD-MBD who presents to ED with CC as above.  She was recently started on RRT for new onset ESRD last week and went to her usual treatmetn on 1/31.  She is walker dependent for gait and mabulation since a CVA in her past.  While walking towards her car, her MAYNOR asked to use her walker and when he took it away from her and in the process she lost her balance and sustained a fall onto the concrete sidewalk.  She had immediate pain and was not able to bear weight or ambulate as a result.  No alleviating factors.  Any movement aggravated the pain. Otherwise felt well and denies any recent F/C/N/V/CP/SOB.  No melena, hematochezia, hematemesis.  No HA, visual changes, or abdominal pain.    DAILY NEPHROLOGY SUMMARY  2/03/20: No events, seen on dialysis this am, BP stable, reports left leg pain controlled  2/04/20: No events, pt in tears and complaining of left leg pain, tolerated HD yest with 2.1L UF, BP borderline low this am and BP meds held    Review of Systems  Review of Systems   Constitutional: Negative.    HENT: Negative.    Eyes: Negative.    Respiratory: Negative.    Cardiovascular: Negative.    Gastrointestinal: Negative.    Musculoskeletal: Positive for falls and joint pain. Negative for myalgias and neck pain.   Skin: Negative.    Neurological: Negative.    Endo/Heme/Allergies: Negative.    Psychiatric/Behavioral: Negative for depression. The patient is nervous/anxious.         Physical Exam  Temp:  [36.2 °C (97.2 °F)-36.7 °C (98.1 °F)] 36.4 °C (97.5 °F)  Pulse:  [76-80] 78  Resp:  [16-18] 16  BP: ()/(65-72) 104/65  SpO2:  [93 %-96 %] 96 %    Physical Exam  Constitutional:       General: She is not in acute distress.     Appearance: Normal appearance.   HENT:      Head: Normocephalic and atraumatic.       Right Ear: External ear normal.      Left Ear: External ear normal.      Nose: Nose normal. No congestion.      Mouth/Throat:      Mouth: Mucous membranes are moist.      Pharynx: No oropharyngeal exudate.   Eyes:      General: No scleral icterus.     Extraocular Movements: Extraocular movements intact.      Conjunctiva/sclera: Conjunctivae normal.   Neck:      Musculoskeletal: Normal range of motion and neck supple.   Cardiovascular:      Rate and Rhythm: Normal rate and regular rhythm.      Heart sounds: Normal heart sounds. No murmur.   Pulmonary:      Effort: Pulmonary effort is normal. No respiratory distress.      Breath sounds: Normal breath sounds.   Abdominal:      General: Abdomen is flat. Bowel sounds are normal. There is no distension.      Palpations: Abdomen is soft.   Musculoskeletal:         General: No tenderness.      Right lower leg: No edema.      Left lower leg: Edema present.      Comments: +Decreased ROM left leg   Lymphadenopathy:      Cervical: No cervical adenopathy.   Skin:     General: Skin is warm and dry.      Findings: No erythema.   Neurological:      Mental Status: She is alert and oriented to person, place, and time. Mental status is at baseline.      Motor: No weakness.   Psychiatric:         Mood and Affect: Mood normal. Affect is tearful.         Behavior: Behavior normal.         Fluids    Intake/Output Summary (Last 24 hours) at 2/4/2020 1219  Last data filed at 2/4/2020 0353  Gross per 24 hour   Intake 240 ml   Output 500 ml   Net -260 ml       Laboratory  Recent Labs     02/02/20  0057  02/02/20  2054 02/03/20  0445 02/04/20  0549   WBC 7.0  --   --  5.5 7.1   RBC 2.71*  --   --  2.59* 2.51*   HEMOGLOBIN 7.9*   < > 7.3* 7.4* 7.3*   HEMATOCRIT 24.6*  --   --  25.3* 24.3*   MCV 91.5  --   --  97.7 96.8   MCH 28.8  --   --  28.6 29.1   MCHC 31.5*  --   --  29.2* 30.0*   RDW 46.5  --   --  49.5 47.9   PLATELETCT 178  --   --  162* 157*   MPV 10.9  --   --  10.8 10.8    < > =  values in this interval not displayed.     Recent Labs     02/02/20  0057 02/03/20  0445 02/04/20  0549   SODIUM 139 134* 132*   POTASSIUM 3.9 4.1 3.9   CHLORIDE 100 96 96   CO2 30 30 30   GLUCOSE 206* 129* 136*   BUN 22 33* 20   CREATININE 3.87* 4.88* 3.34*   CALCIUM 8.6 8.3* 8.2*         No results for input(s): NTPROBNP in the last 72 hours.          IMPRESSION:  - ESRD    * Etiology likely 2/2 HTN/DM    * MWF iHD @ [facility]  - Left subtrochanteric femur fracture    * S/P surgical repair  - HTN    * Goal BP < 140/90    * Stable  - Anemia of CKD    * Goal Hgb 10-11    * Iron sat low    * Stable  - CKD-MBD    * Managed at HD unit    * Stable  - HLD  - CAD  - CVA  -Moderate Protein-Calorie Malnutrition     PLAN:  - HD tomorrow and qMWF iHD  - UF with HD as tolerated  - PT/OT as tolerated  - No dietary protein restrictions  - Dose all meds per ESRD  - Epogen with HD  -Transfuse PRN for Hgb less than 7  - IV Iron replacement started  - Pain management per primary svc  - Stop hydralazine  - Holding parameters in place for BP meds    **Discussed with RN     All prior notes form other doctors and RN staff were reviewed from admission to current day to help me make my clinical decisions           No

## 2025-05-23 NOTE — ED PROVIDER NOTE - NSFOLLOWUPINSTRUCTIONS_ED_ALL_ED_FT
You were seen in the emergency department for bleeding from recent cochlear implant site.    You were evaluated by the ENT and had Afrin, Surgicel, Steri-Strips placed.    Please continue to monitor for any increased bleeding.  Afrin was also provided to you.    Return the emergency department for new or worsening symptoms.

## 2025-05-23 NOTE — DISCHARGE NOTE NURSING/CASE MANAGEMENT/SOCIAL WORK - FINANCIAL ASSISTANCE
[facility] provides services at a reduced cost to those who are determined to be eligible through [facility]’s financial assistance program. Information regarding [facility]’s financial assistance program can be found by going to [email or web address] or by calling [phone].

## 2025-05-23 NOTE — ED ADULT TRIAGE NOTE - CHIEF COMPLAINT QUOTE
Pt 1 day s/p L cochlear implant surgery c/o bleeding from the ear. No active bleeding in triage. Dried blood to the neck. Pt deaf. Pt paralyzed from skiing accident.

## 2025-05-23 NOTE — DISCHARGE NOTE NURSING/CASE MANAGEMENT/SOCIAL WORK - NSDCVIVACCINE_GEN_ALL_CORE_FT
Influenza, high-dose, trivalent, preservative free; 02-Dec-2024 13:34; Paulina Lopez (RN); [employer]; [id number] (Exp. Date: 01-Jun-2025); IntraMuscular; Deltoid Right.; 0.5 milliLiter(s); VIS (VIS Published: 06-Aug-2021, VIS Presented: 02-Dec-2024);

## 2025-05-23 NOTE — ED ADULT NURSE REASSESSMENT NOTE - NS ED NURSE REASSESS COMMENT FT1
Break coverage RN. Patient resting in stretcher, respirations even and unlabored. Patient pending transportation back home. Stretcher in lowest position, wheels locked, appropriate side rails in place, patient in view of RN station.

## 2025-05-23 NOTE — PROVIDER CONTACT NOTE (OTHER) - ASSESSMENT
Pt needed transportation, sister is with pt, she asked for an Ambulette with [employer]. CORNELIO arranged [employer] [phone], Trip# [phone]. Sister paid $290.00 with credit card. P/U 4 PM.

## 2025-05-23 NOTE — DISCHARGE NOTE PROVIDER - NSDCCPCAREPLAN_GEN_ALL_CORE_FT
PRINCIPAL DISCHARGE DIAGNOSIS  Diagnosis: Hearing loss on left  Assessment and Plan of Treatment: - Please follow up with Dr. Hilliard as scheduled

## 2025-05-23 NOTE — PROGRESS NOTE ADULT - SUBJECTIVE AND OBJECTIVE BOX
ENT Progress Note    Interval:  examined at bedside. started on oflox drops per corneal abrasion protocol. Eye feels much better this morning. Pain around left ear is well controlled. Otherwise doing well. Dressing removed, incision is flat, no active bleeding.         PAST MEDICAL & SURGICAL HISTORY:  Deaf        Allergies    No Known Allergies    Intolerances      MEDICATIONS  (STANDING):  enoxaparin Injectable 40 milliGRAM(s) SubCutaneous every 24 hours  ofloxacin 0.3% Solution 1 Drop(s) Right EYE every 6 hours    MEDICATIONS  (PRN):  acetaminophen     Tablet .. 650 milliGRAM(s) Oral every 6 hours PRN Mild Pain (1 - 3)  artificial  tears Solution 1 Drop(s) Both EYES every 3 hours PRN Dry Eyes  HYDROmorphone  Injectable 1 milliGRAM(s) IV Push every 10 minutes PRN Severe Pain (7 - 10)  oxyCODONE    IR 5 milliGRAM(s) Oral every 6 hours PRN Moderate Pain (4 - 6)  oxyCODONE    IR 10 milliGRAM(s) Oral every 6 hours PRN Severe Pain (7 - 10)        Vital Signs Last 24 Hrs  T(C): 36.5 (23 May 2025 06:05), Max: 37 (22 May 2025 16:20)  T(F): 97.7 (23 May 2025 06:05), Max: 98.6 (22 May 2025 16:20)  HR: 66 (23 May 2025 06:05) (62 - 94)  BP: 155/68 (23 May 2025 06:05) (109/70 - 155/68)  BP(mean): 99 (22 May 2025 18:00) (75 - 99)  RR: 18 (23 May 2025 06:05) (14 - 20)  SpO2: 99% (23 May 2025 06:05) (93% - 99%)    Parameters below as of 23 May 2025 06:05  Patient On (Oxygen Delivery Method): room air        Physical Exam:  Gen: NAD  Ear: left postauricular incision with steri strip, soft, flat, no oozing  Resp: Breathing comfortably on RA        05-22-25 @ 07:01  -  05-23-25 @ 07:00  --------------------------------------------------------  IN: 200 mL / OUT: 500 mL / NET: -300 mL                    
s/p left cochlear implant   no vertigo, no events over night  tolerating PO  wound clean flat - slight serosang drainage - steri replaced  CN VII normal, no nystagmus

## 2025-05-23 NOTE — DISCHARGE NOTE PROVIDER - NSDCMRMEDTOKEN_GEN_ALL_CORE_FT
cefdinir 300 mg oral capsule: 1 cap(s) orally 2 times a day MDD: 2  codeine-acetaminophen 30 mg-300 mg oral tablet: 2 tab(s) orally every 6 hours as needed for  severe pain MDD: 8  Lovenox 40 mg/0.4 mL injectable solution: 40 milligram(s) subcutaneously once a day

## 2025-05-23 NOTE — DISCHARGE NOTE NURSING/CASE MANAGEMENT/SOCIAL WORK - PATIENT PORTAL LINK FT
You can access the FollowMyHealth Patient Portal offered by [facility] by registering at the following website: [email or web address]. By joining [employer]’s FollowMyHealth portal, you will also be able to view your health information using other applications (apps) compatible with our system.

## 2025-05-23 NOTE — DISCHARGE NOTE PROVIDER - HOSPITAL COURSE
64 year old male with a history of spinal cord injury and left hearing loss S/P left cochlear implant on 5/22/2025. Patient admitted overnight to floor for postop monitoring. Felicia removed prior to discharge. Patient was cleared for discharge home by Dr. Hilliard on 5/23/2025. All prescriptions were sent to a pharmacy that was agreed on with the patient.

## 2025-05-23 NOTE — DISCHARGE NOTE PROVIDER - NSDCFUSCHEDAPPT_GEN_ALL_CORE_FT
Vitor Hilliard  [facility]  [address]  Scheduled Appointment: 05/28/2025    [facility]  [address]  Scheduled Appointment: 06/11/2025

## 2025-05-28 ENCOUNTER — APPOINTMENT (OUTPATIENT)
Dept: OTOLARYNGOLOGY | Facility: CLINIC | Age: 65
End: 2025-05-28
Payer: MEDICARE

## 2025-05-28 DIAGNOSIS — H90.3 SENSORINEURAL HEARING LOSS, BILATERAL: ICD-10-CM

## 2025-05-28 PROCEDURE — 99024 POSTOP FOLLOW-UP VISIT: CPT

## 2025-06-11 ENCOUNTER — APPOINTMENT (OUTPATIENT)
Dept: SPEECH THERAPY | Facility: CLINIC | Age: 65
End: 2025-06-11

## 2025-06-19 ENCOUNTER — APPOINTMENT (OUTPATIENT)
Dept: UROLOGY | Facility: CLINIC | Age: 65
End: 2025-06-19
Payer: MEDICARE

## 2025-06-19 VITALS
DIASTOLIC BLOOD PRESSURE: 82 MMHG | OXYGEN SATURATION: 94 % | WEIGHT: 205 LBS | HEART RATE: 52 BPM | HEIGHT: 69 IN | SYSTOLIC BLOOD PRESSURE: 166 MMHG | RESPIRATION RATE: 16 BRPM | BODY MASS INDEX: 30.36 KG/M2

## 2025-06-19 PROBLEM — Z12.5 SCREENING PSA (PROSTATE SPECIFIC ANTIGEN): Status: ACTIVE | Noted: 2025-06-19

## 2025-06-19 PROCEDURE — 99204 OFFICE O/P NEW MOD 45 MIN: CPT

## 2025-06-19 RX ORDER — TAMSULOSIN HYDROCHLORIDE 0.4 MG/1
0.4 CAPSULE ORAL
Qty: 30 | Refills: 1 | Status: ACTIVE | COMMUNITY
Start: 2025-06-19 | End: 1900-01-01

## 2025-06-19 RX ORDER — BETHANECHOL CHLORIDE 50 MG/1
50 TABLET ORAL
Qty: 30 | Refills: 3 | Status: ACTIVE | COMMUNITY
Start: 2025-06-19 | End: 1900-01-01

## 2025-06-20 LAB
ANION GAP SERPL CALC-SCNC: 15 MMOL/L
APPEARANCE: CLEAR
BACTERIA: NEGATIVE /HPF
BILIRUBIN URINE: NEGATIVE
BLOOD URINE: NEGATIVE
BUN SERPL-MCNC: 17 MG/DL
CALCIUM SERPL-MCNC: 9.8 MG/DL
CAST: 0 /LPF
CHLORIDE SERPL-SCNC: 101 MMOL/L
CO2 SERPL-SCNC: 25 MMOL/L
COLOR: YELLOW
CREAT SERPL-MCNC: 0.8 MG/DL
EGFRCR SERPLBLD CKD-EPI 2021: 98 ML/MIN/1.73M2
EPITHELIAL CELLS: 0 /HPF
GLUCOSE QUALITATIVE U: NEGATIVE MG/DL
GLUCOSE SERPL-MCNC: 117 MG/DL
KETONES URINE: NEGATIVE MG/DL
LEUKOCYTE ESTERASE URINE: NEGATIVE
MICROSCOPIC-UA: NORMAL
NITRITE URINE: NEGATIVE
PH URINE: 6.5
POTASSIUM SERPL-SCNC: 4.1 MMOL/L
PROTEIN URINE: NEGATIVE MG/DL
PSA SERPL-MCNC: 0.39 NG/ML
RED BLOOD CELLS URINE: 0 /HPF
SODIUM SERPL-SCNC: 141 MMOL/L
SPECIFIC GRAVITY URINE: 1.01
UROBILINOGEN URINE: 0.2 MG/DL
WHITE BLOOD CELLS URINE: 3 /HPF

## 2025-06-21 LAB — BACTERIA UR CULT: NORMAL

## 2025-06-24 ENCOUNTER — APPOINTMENT (OUTPATIENT)
Dept: NEUROSURGERY | Facility: CLINIC | Age: 65
End: 2025-06-24
Payer: MEDICARE

## 2025-06-24 VITALS
TEMPERATURE: 97 F | HEIGHT: 69 IN | DIASTOLIC BLOOD PRESSURE: 74 MMHG | HEART RATE: 57 BPM | WEIGHT: 205 LBS | BODY MASS INDEX: 30.36 KG/M2 | OXYGEN SATURATION: 92 % | SYSTOLIC BLOOD PRESSURE: 131 MMHG

## 2025-06-24 PROCEDURE — 99204 OFFICE O/P NEW MOD 45 MIN: CPT

## 2025-06-25 ENCOUNTER — APPOINTMENT (OUTPATIENT)
Dept: SPEECH THERAPY | Facility: CLINIC | Age: 65
End: 2025-06-25

## 2025-06-25 ENCOUNTER — APPOINTMENT (OUTPATIENT)
Dept: OTOLARYNGOLOGY | Facility: CLINIC | Age: 65
End: 2025-06-25

## 2025-07-11 ENCOUNTER — APPOINTMENT (OUTPATIENT)
Dept: MRI IMAGING | Facility: CLINIC | Age: 65
End: 2025-07-11

## 2025-07-17 ENCOUNTER — APPOINTMENT (OUTPATIENT)
Dept: NEUROSURGERY | Facility: CLINIC | Age: 65
End: 2025-07-17

## 2025-07-18 ENCOUNTER — APPOINTMENT (OUTPATIENT)
Dept: UROLOGY | Facility: CLINIC | Age: 65
End: 2025-07-18
Payer: MEDICARE

## 2025-07-18 PROCEDURE — 51702 INSERT TEMP BLADDER CATH: CPT

## 2025-07-18 PROCEDURE — 51798 US URINE CAPACITY MEASURE: CPT

## 2025-08-15 ENCOUNTER — APPOINTMENT (OUTPATIENT)
Dept: UROLOGY | Facility: CLINIC | Age: 65
End: 2025-08-15

## 2025-08-16 ENCOUNTER — EMERGENCY (EMERGENCY)
Facility: HOSPITAL | Age: 65
LOS: 0 days | Discharge: HOME | End: 2025-08-16
Attending: FAMILY MEDICINE
Payer: MEDICARE

## 2025-08-16 VITALS
TEMPERATURE: 98 F | DIASTOLIC BLOOD PRESSURE: 58 MMHG | OXYGEN SATURATION: 97 % | RESPIRATION RATE: 18 BRPM | HEART RATE: 59 BPM | SYSTOLIC BLOOD PRESSURE: 114 MMHG

## 2025-08-16 VITALS
WEIGHT: 182.1 LBS | OXYGEN SATURATION: 98 % | HEART RATE: 70 BPM | TEMPERATURE: 98 F | SYSTOLIC BLOOD PRESSURE: 110 MMHG | DIASTOLIC BLOOD PRESSURE: 60 MMHG | RESPIRATION RATE: 18 BRPM

## 2025-08-16 DIAGNOSIS — T83.038A LEAKAGE OF OTHER URINARY CATHETER, INITIAL ENCOUNTER: ICD-10-CM

## 2025-08-16 DIAGNOSIS — T83.9XXA UNSPECIFIED COMPLICATION OF GENITOURINARY PROSTHETIC DEVICE, IMPLANT AND GRAFT, INITIAL ENCOUNTER: ICD-10-CM

## 2025-08-16 DIAGNOSIS — N39.0 URINARY TRACT INFECTION, SITE NOT SPECIFIED: ICD-10-CM

## 2025-08-16 LAB
APPEARANCE UR: ABNORMAL
BACTERIA # UR AUTO: ABNORMAL /HPF
BILIRUB UR-MCNC: ABNORMAL
CAST: 4 /LPF — SIGNIFICANT CHANGE UP (ref 0–4)
COLOR SPEC: SIGNIFICANT CHANGE UP
DIFF PNL FLD: ABNORMAL
GLUCOSE UR QL: NEGATIVE MG/DL — SIGNIFICANT CHANGE UP
GRAN CASTS # UR COMP ASSIST: SIGNIFICANT CHANGE UP
HYALINE CASTS # UR AUTO: SIGNIFICANT CHANGE UP
KETONES UR QL: ABNORMAL MG/DL
LEUKOCYTE ESTERASE UR-ACNC: ABNORMAL
NITRITE UR-MCNC: POSITIVE
PH UR: 5.5 — SIGNIFICANT CHANGE UP (ref 5–8)
PROT UR-MCNC: 300 MG/DL
RBC CASTS # UR COMP ASSIST: 38 /HPF — HIGH (ref 0–4)
SP GR SPEC: 1.02 — SIGNIFICANT CHANGE UP (ref 1–1.03)
SQUAMOUS # UR AUTO: 2 /HPF — SIGNIFICANT CHANGE UP (ref 0–5)
UROBILINOGEN FLD QL: 1 MG/DL — SIGNIFICANT CHANGE UP (ref 0.2–1)
WBC UR QL: >998 /HPF — HIGH (ref 0–5)

## 2025-08-16 PROCEDURE — 99284 EMERGENCY DEPT VISIT MOD MDM: CPT

## 2025-08-16 PROCEDURE — 99283 EMERGENCY DEPT VISIT LOW MDM: CPT | Mod: 25

## 2025-08-16 PROCEDURE — 87086 URINE CULTURE/COLONY COUNT: CPT

## 2025-08-16 PROCEDURE — 51702 INSERT TEMP BLADDER CATH: CPT

## 2025-08-16 PROCEDURE — 87186 SC STD MICRODIL/AGAR DIL: CPT

## 2025-08-16 PROCEDURE — 81001 URINALYSIS AUTO W/SCOPE: CPT

## 2025-08-16 PROCEDURE — 87077 CULTURE AEROBIC IDENTIFY: CPT

## 2025-08-16 RX ORDER — CEPHALEXIN 250 MG/1
500 CAPSULE ORAL ONCE
Refills: 0 | Status: COMPLETED | OUTPATIENT
Start: 2025-08-16 | End: 2025-08-16

## 2025-08-16 RX ORDER — CEPHALEXIN 250 MG/1
1 CAPSULE ORAL
Qty: 20 | Refills: 0
Start: 2025-08-16 | End: 2025-08-25

## 2025-08-16 RX ADMIN — CEPHALEXIN 500 MILLIGRAM(S): 250 CAPSULE ORAL at 16:30

## 2025-08-18 ENCOUNTER — APPOINTMENT (OUTPATIENT)
Dept: UROLOGY | Facility: CLINIC | Age: 65
End: 2025-08-18
Payer: MEDICARE

## 2025-08-18 VITALS
HEIGHT: 70 IN | DIASTOLIC BLOOD PRESSURE: 63 MMHG | HEART RATE: 83 BPM | TEMPERATURE: 97.8 F | RESPIRATION RATE: 15 BRPM | BODY MASS INDEX: 30.06 KG/M2 | OXYGEN SATURATION: 98 % | SYSTOLIC BLOOD PRESSURE: 107 MMHG | WEIGHT: 210 LBS

## 2025-08-18 DIAGNOSIS — N13.8 BENIGN PROSTATIC HYPERPLASIA WITH LOWER URINARY TRACT SYMPMS: ICD-10-CM

## 2025-08-18 DIAGNOSIS — N40.1 BENIGN PROSTATIC HYPERPLASIA WITH LOWER URINARY TRACT SYMPMS: ICD-10-CM

## 2025-08-18 DIAGNOSIS — R33.9 RETENTION OF URINE, UNSPECIFIED: ICD-10-CM

## 2025-08-18 DIAGNOSIS — S14.109D UNSPECIFIED INJURY AT UNSPECIFIED LVL OF CERVICAL SPINAL CORD, SUBSEQUENT ENCOUNTER: ICD-10-CM

## 2025-08-18 PROBLEM — Z87.39 PERSONAL HISTORY OF OTHER DISEASES OF THE MUSCULOSKELETAL SYSTEM AND CONNECTIVE TISSUE: Chronic | Status: ACTIVE | Noted: 2025-08-16

## 2025-08-18 PROBLEM — Z97.8 PRESENCE OF OTHER SPECIFIED DEVICES: Chronic | Status: ACTIVE | Noted: 2025-08-16

## 2025-08-18 PROCEDURE — 51797 INTRAABDOMINAL PRESSURE TEST: CPT

## 2025-08-18 PROCEDURE — 51729 CYSTOMETROGRAM W/VP&UP: CPT

## 2025-08-18 PROCEDURE — 51784 ANAL/URINARY MUSCLE STUDY: CPT

## 2025-08-21 LAB
-  AMOXICILLIN/CLAVULANIC ACID: SIGNIFICANT CHANGE UP
-  AMOXICILLIN/CLAVULANIC ACID: SIGNIFICANT CHANGE UP
-  AMPICILLIN/SULBACTAM: SIGNIFICANT CHANGE UP
-  AMPICILLIN/SULBACTAM: SIGNIFICANT CHANGE UP
-  AMPICILLIN: SIGNIFICANT CHANGE UP
-  AMPICILLIN: SIGNIFICANT CHANGE UP
-  AZTREONAM: SIGNIFICANT CHANGE UP
-  AZTREONAM: SIGNIFICANT CHANGE UP
-  CEFAZOLIN: SIGNIFICANT CHANGE UP
-  CEFAZOLIN: SIGNIFICANT CHANGE UP
-  CEFEPIME: SIGNIFICANT CHANGE UP
-  CEFEPIME: SIGNIFICANT CHANGE UP
-  CEFOXITIN: SIGNIFICANT CHANGE UP
-  CEFOXITIN: SIGNIFICANT CHANGE UP
-  CEFTRIAXONE: SIGNIFICANT CHANGE UP
-  CEFTRIAXONE: SIGNIFICANT CHANGE UP
-  CEFUROXIME: SIGNIFICANT CHANGE UP
-  CIPROFLOXACIN: SIGNIFICANT CHANGE UP
-  CIPROFLOXACIN: SIGNIFICANT CHANGE UP
-  ERTAPENEM: SIGNIFICANT CHANGE UP
-  ERTAPENEM: SIGNIFICANT CHANGE UP
-  GENTAMICIN: SIGNIFICANT CHANGE UP
-  GENTAMICIN: SIGNIFICANT CHANGE UP
-  IMIPENEM: SIGNIFICANT CHANGE UP
-  LEVOFLOXACIN: SIGNIFICANT CHANGE UP
-  LEVOFLOXACIN: SIGNIFICANT CHANGE UP
-  MEROPENEM: SIGNIFICANT CHANGE UP
-  MEROPENEM: SIGNIFICANT CHANGE UP
-  NITROFURANTOIN: SIGNIFICANT CHANGE UP
-  NITROFURANTOIN: SIGNIFICANT CHANGE UP
-  PIPERACILLIN/TAZOBACTAM: SIGNIFICANT CHANGE UP
-  PIPERACILLIN/TAZOBACTAM: SIGNIFICANT CHANGE UP
-  TIGECYCLINE: SIGNIFICANT CHANGE UP
-  TOBRAMYCIN: SIGNIFICANT CHANGE UP
-  TOBRAMYCIN: SIGNIFICANT CHANGE UP
-  TRIMETHOPRIM/SULFAMETHOXAZOLE: SIGNIFICANT CHANGE UP
-  TRIMETHOPRIM/SULFAMETHOXAZOLE: SIGNIFICANT CHANGE UP
CULTURE RESULTS: ABNORMAL
METHOD TYPE: SIGNIFICANT CHANGE UP
METHOD TYPE: SIGNIFICANT CHANGE UP
ORGANISM # SPEC MICROSCOPIC CNT: ABNORMAL
ORGANISM # SPEC MICROSCOPIC CNT: ABNORMAL
ORGANISM # SPEC MICROSCOPIC CNT: SIGNIFICANT CHANGE UP
SPECIMEN SOURCE: SIGNIFICANT CHANGE UP

## 2025-09-02 RX ORDER — SULFAMETHOXAZOLE/TRIMETHOPRIM 800-160 MG
1 TABLET ORAL
Qty: 14 | Refills: 0
Start: 2025-09-02 | End: 2025-09-08

## 2025-09-16 ENCOUNTER — APPOINTMENT (OUTPATIENT)
Dept: UROLOGY | Facility: CLINIC | Age: 65
End: 2025-09-16
Payer: MEDICARE

## 2025-09-16 DIAGNOSIS — R33.9 RETENTION OF URINE, UNSPECIFIED: ICD-10-CM

## 2025-09-16 PROCEDURE — 51702 INSERT TEMP BLADDER CATH: CPT

## (undated) DEVICE — COTTONBALL LG

## (undated) DEVICE — DRAIN PENROSE .5" X 18" LATEX

## (undated) DEVICE — DRAPE 3/4 SHEET 52X76"

## (undated) DEVICE — ELCTR BOVIE TIP BLADE INSULATED 2.75" EDGE

## (undated) DEVICE — BUR MEDTRONIC ENT VISAO COCHLEOSTOMY CURVED FINE DIAMOND 20 DEGREE 1.5MM X 98MM

## (undated) DEVICE — NDL SPINAL 22G X 2.5" QUINCKE

## (undated) DEVICE — SUT CHROMIC 3-0 27" RB-1

## (undated) DEVICE — DRSG STERISTRIPS 0.5 X 4"

## (undated) DEVICE — DRSG PELLET

## (undated) DEVICE — SUT PLAIN GUT FAST ABSORBING 5-0 PC-1

## (undated) DEVICE — BUR MEDTRONIC ENT VISAO COCHLEOSTOMY CURVED FINE DIAMOND 20 DEGREE 1.0MM X 98MM

## (undated) DEVICE — ELCTR MONOPOLAR STIMULATOR PROBE FLUSH-TIP

## (undated) DEVICE — PREP BETADINE KIT

## (undated) DEVICE — SYR LUER LOK 20CC

## (undated) DEVICE — SYR LUER LOK 3CC

## (undated) DEVICE — LABELS BLANK W PEN

## (undated) DEVICE — STRYKER RIO SYSTEM IRRIGATION TUBE

## (undated) DEVICE — DRILL BIT ANSPACH DIAMOND BALL 4MM X 25.4MM

## (undated) DEVICE — CATH IV SAFE INSYTE 14G X 1.75" (ORANGE)

## (undated) DEVICE — DRSG TAPE UMBILICAL COTTON 2" X 30 X 1/8"

## (undated) DEVICE — VENODYNE/SCD SLEEVE CALF MEDIUM

## (undated) DEVICE — SUT MONOCRYL 4-0 27" PS-2 UNDYED

## (undated) DEVICE — DRAPE IOBAN 23" X 23"

## (undated) DEVICE — TUBING IRRIGATION HF NAVIO

## (undated) DEVICE — DRILL BIT ANSPACH FLUTED ACORN 5MMX25.4MM

## (undated) DEVICE — DRILL BIT ANSPACH DIAMOND BALL 1MMX5CM

## (undated) DEVICE — GLV 7 PROTEXIS (WHITE)

## (undated) DEVICE — SUT ETHIBOND 2-0 30" RB-1

## (undated) DEVICE — WARMING BLANKET FULL ADULT

## (undated) DEVICE — ELCTR GROUNDING PAD ADULT COVIDIEN

## (undated) DEVICE — DRSG TELFA 3 X 8

## (undated) DEVICE — TAPE SILK 3"

## (undated) DEVICE — PACK MASTOID/MIDDLE EAR

## (undated) DEVICE — DRILL BIT ANSPACH DIAMOND BALL 3MMX5CM

## (undated) DEVICE — DRSG KLING 4"

## (undated) DEVICE — SYR LUER LOK 1CC

## (undated) DEVICE — DRAPE TOWEL BLUE 17" X 24"

## (undated) DEVICE — ELCTR NDL SUBDERMAL 2 CHANNEL

## (undated) DEVICE — MARKING PEN W RULER / LABELS

## (undated) DEVICE — DRSG MASTISOL

## (undated) DEVICE — POSITIONER PINK PAD PIGAZZI SYSTEM

## (undated) DEVICE — SOL IRR POUR H2O 500ML

## (undated) DEVICE — SOL IRR POUR NS 0.9% 1500ML

## (undated) DEVICE — DRAPE THYROID 77" X 123"

## (undated) DEVICE — DRAPE MAYO STAND 30"

## (undated) DEVICE — DRAPE TOWEL BLUE STICKY

## (undated) DEVICE — DRILL BIT ANSPACH DIAMOND BALL 1.5MMX5CM

## (undated) DEVICE — DRAPE MICROSCOPE OPMI VISIONGUARD 48X118"

## (undated) DEVICE — TUBING COOLANT

## (undated) DEVICE — DRAPE CAMERA ENDOMATE

## (undated) DEVICE — DRILL BIT ANSPACH DIAMOND BALL 2MMX5CM